# Patient Record
Sex: MALE | Race: BLACK OR AFRICAN AMERICAN | NOT HISPANIC OR LATINO | Employment: OTHER | URBAN - METROPOLITAN AREA
[De-identification: names, ages, dates, MRNs, and addresses within clinical notes are randomized per-mention and may not be internally consistent; named-entity substitution may affect disease eponyms.]

---

## 2018-11-26 ENCOUNTER — TELEPHONE (OUTPATIENT)
Dept: ENDOSCOPY | Facility: HOSPITAL | Age: 43
End: 2018-11-26

## 2018-11-26 DIAGNOSIS — R93.89 ABNORMAL FINDING ON IMAGING: Primary | ICD-10-CM

## 2018-11-27 NOTE — TELEPHONE ENCOUNTER
Message   Received: Today   Message Contents   MD Tiffany Sanchez MA   Caller: Unspecified (Yesterday,  3:35 PM)             EUS FNA for hypodense region int he uncinate measuring 2.4cm.     Has a pd stone with a history of pancreatitis.  Hold off on ERCP for now but talk to him about it while hes there for the EUS      Please sign order

## 2018-11-28 ENCOUNTER — TELEPHONE (OUTPATIENT)
Dept: ENDOSCOPY | Facility: HOSPITAL | Age: 43
End: 2018-11-28

## 2018-12-03 ENCOUNTER — TELEPHONE (OUTPATIENT)
Dept: ENDOSCOPY | Facility: HOSPITAL | Age: 43
End: 2018-12-03

## 2018-12-03 NOTE — TELEPHONE ENCOUNTER
Attempted to contact patient to schedule EUS. Got recording that patient is not accepting calls at this time.

## 2018-12-10 ENCOUNTER — TELEPHONE (OUTPATIENT)
Dept: ENDOSCOPY | Facility: HOSPITAL | Age: 43
End: 2018-12-10

## 2018-12-10 NOTE — TELEPHONE ENCOUNTER
Attempted to contact patient to schedule EUS. Got recording that he was not accepting call at this time.

## 2018-12-27 ENCOUNTER — TELEPHONE (OUTPATIENT)
Dept: ENDOSCOPY | Facility: HOSPITAL | Age: 43
End: 2018-12-27

## 2019-01-15 ENCOUNTER — TELEPHONE (OUTPATIENT)
Dept: ENDOSCOPY | Facility: HOSPITAL | Age: 44
End: 2019-01-15

## 2019-01-30 ENCOUNTER — TELEPHONE (OUTPATIENT)
Dept: ENDOSCOPY | Facility: HOSPITAL | Age: 44
End: 2019-01-30

## 2019-01-30 RX ORDER — LISINOPRIL 5 MG/1
5 TABLET ORAL DAILY
COMMUNITY

## 2019-01-30 RX ORDER — METFORMIN HYDROCHLORIDE 1000 MG/1
1000 TABLET ORAL
COMMUNITY

## 2019-01-30 RX ORDER — HYDRALAZINE HYDROCHLORIDE 10 MG/1
20 TABLET, FILM COATED ORAL DAILY
COMMUNITY
End: 2019-07-25

## 2019-03-26 ENCOUNTER — TELEPHONE (OUTPATIENT)
Dept: ENDOSCOPY | Facility: HOSPITAL | Age: 44
End: 2019-03-26

## 2019-03-26 NOTE — TELEPHONE ENCOUNTER
----- Message from Mary Jimenez sent at 3/26/2019  8:26 AM CDT -----  Contact: Self- [phone]  Donna- pt states he is trying to schedule a procedure with Dr. Thompson- was told to call back when he had updated insurance info- please contact pt at [phone]

## 2019-03-27 ENCOUNTER — TELEPHONE (OUTPATIENT)
Dept: ENDOSCOPY | Facility: HOSPITAL | Age: 44
End: 2019-03-27

## 2019-03-27 DIAGNOSIS — K86.1 CHRONIC PANCREATITIS, UNSPECIFIED PANCREATITIS TYPE: Primary | ICD-10-CM

## 2019-03-27 NOTE — TELEPHONE ENCOUNTER
Spoke with patient. EUS scheduled for 4/5 at 2p. Reviewed prep instructions. Ms Castro verbalized understanding.

## 2019-03-28 ENCOUNTER — TELEPHONE (OUTPATIENT)
Dept: ENDOSCOPY | Facility: HOSPITAL | Age: 44
End: 2019-03-28

## 2019-04-16 ENCOUNTER — TELEPHONE (OUTPATIENT)
Dept: ENDOSCOPY | Facility: HOSPITAL | Age: 44
End: 2019-04-16

## 2019-04-16 DIAGNOSIS — K85.90 ACUTE PANCREATITIS, UNSPECIFIED COMPLICATION STATUS, UNSPECIFIED PANCREATITIS TYPE: Primary | ICD-10-CM

## 2019-04-17 ENCOUNTER — TELEPHONE (OUTPATIENT)
Dept: ENDOSCOPY | Facility: HOSPITAL | Age: 44
End: 2019-04-17

## 2019-05-16 ENCOUNTER — TELEPHONE (OUTPATIENT)
Dept: ENDOSCOPY | Facility: HOSPITAL | Age: 44
End: 2019-05-16

## 2019-05-16 NOTE — TELEPHONE ENCOUNTER
Spoke with patient's SO. EUS scheduled for 6/13 at 10a. Reviewed prep instructions. She verbalized understanding.

## 2019-05-16 NOTE — TELEPHONE ENCOUNTER
----- Message from Mary Jimenez sent at 5/16/2019 11:39 AM CDT -----  Contact: Self- [phone]  Ananth- pt returning missed call from 4/17 in regards to scheduling EUS- please contact pt at [phone]

## 2019-05-29 ENCOUNTER — TELEPHONE (OUTPATIENT)
Dept: ENDOSCOPY | Facility: HOSPITAL | Age: 44
End: 2019-05-29

## 2019-06-13 ENCOUNTER — HOSPITAL ENCOUNTER (OUTPATIENT)
Facility: HOSPITAL | Age: 44
Discharge: HOME | End: 2019-06-13
Attending: INTERNAL MEDICINE | Admitting: INTERNAL MEDICINE
Payer: MEDICARE

## 2019-06-13 ENCOUNTER — ANESTHESIA (OUTPATIENT)
Dept: ENDOSCOPY | Facility: HOSPITAL | Age: 44
End: 2019-06-13
Payer: MEDICARE

## 2019-06-13 ENCOUNTER — ANESTHESIA EVENT (OUTPATIENT)
Dept: ENDOSCOPY | Facility: HOSPITAL | Age: 44
End: 2019-06-13
Payer: MEDICARE

## 2019-06-13 VITALS
DIASTOLIC BLOOD PRESSURE: 83 MMHG | OXYGEN SATURATION: 100 % | HEART RATE: 60 BPM | WEIGHT: 205 LBS | TEMPERATURE: 99 F | SYSTOLIC BLOOD PRESSURE: 157 MMHG | HEIGHT: 74 IN | BODY MASS INDEX: 26.31 KG/M2 | RESPIRATION RATE: 12 BRPM

## 2019-06-13 DIAGNOSIS — K85.90 ACUTE PANCREATITIS, UNSPECIFIED COMPLICATION STATUS, UNSPECIFIED PANCREATITIS TYPE: Primary | ICD-10-CM

## 2019-06-13 DIAGNOSIS — K86.1 CHRONIC PANCREATITIS: ICD-10-CM

## 2019-06-13 LAB
POCT GLUCOSE: 164 MG/DL (ref 70–110)
POCT GLUCOSE: 172 MG/DL (ref 70–110)

## 2019-06-13 PROCEDURE — D9220A PRA ANESTHESIA: Mod: ANES,,, | Performed by: ANESTHESIOLOGY

## 2019-06-13 PROCEDURE — 82962 GLUCOSE BLOOD TEST: CPT | Performed by: INTERNAL MEDICINE

## 2019-06-13 PROCEDURE — D9220A PRA ANESTHESIA: Mod: CRNA,,, | Performed by: NURSE ANESTHETIST, CERTIFIED REGISTERED

## 2019-06-13 PROCEDURE — D9220A PRA ANESTHESIA: ICD-10-PCS | Mod: ANES,,, | Performed by: ANESTHESIOLOGY

## 2019-06-13 PROCEDURE — 37000009 HC ANESTHESIA EA ADD 15 MINS: Performed by: INTERNAL MEDICINE

## 2019-06-13 PROCEDURE — D9220A PRA ANESTHESIA: ICD-10-PCS | Mod: CRNA,,, | Performed by: NURSE ANESTHETIST, CERTIFIED REGISTERED

## 2019-06-13 PROCEDURE — 63600175 PHARM REV CODE 636 W HCPCS: Performed by: NURSE ANESTHETIST, CERTIFIED REGISTERED

## 2019-06-13 PROCEDURE — 25000003 PHARM REV CODE 250: Performed by: INTERNAL MEDICINE

## 2019-06-13 PROCEDURE — 43259 EGD US EXAM DUODENUM/JEJUNUM: CPT | Mod: ,,, | Performed by: INTERNAL MEDICINE

## 2019-06-13 PROCEDURE — 43259 EGD US EXAM DUODENUM/JEJUNUM: CPT | Performed by: INTERNAL MEDICINE

## 2019-06-13 PROCEDURE — 43259 PR ENDOSCOPIC ULTRASOUND EXAM: ICD-10-PCS | Mod: ,,, | Performed by: INTERNAL MEDICINE

## 2019-06-13 PROCEDURE — 37000008 HC ANESTHESIA 1ST 15 MINUTES: Performed by: INTERNAL MEDICINE

## 2019-06-13 RX ORDER — ONDANSETRON 2 MG/ML
4 INJECTION INTRAMUSCULAR; INTRAVENOUS ONCE AS NEEDED
Status: DISCONTINUED | OUTPATIENT
Start: 2019-06-13 | End: 2019-06-13 | Stop reason: HOSPADM

## 2019-06-13 RX ORDER — SODIUM CHLORIDE 0.9 % (FLUSH) 0.9 %
10 SYRINGE (ML) INJECTION
Status: DISCONTINUED | OUTPATIENT
Start: 2019-06-13 | End: 2019-06-13 | Stop reason: HOSPADM

## 2019-06-13 RX ORDER — SODIUM CHLORIDE 0.9 % (FLUSH) 0.9 %
3 SYRINGE (ML) INJECTION
Status: DISCONTINUED | OUTPATIENT
Start: 2019-06-13 | End: 2019-06-13 | Stop reason: HOSPADM

## 2019-06-13 RX ORDER — PROPOFOL 10 MG/ML
VIAL (ML) INTRAVENOUS CONTINUOUS PRN
Status: DISCONTINUED | OUTPATIENT
Start: 2019-06-13 | End: 2019-06-13

## 2019-06-13 RX ORDER — SODIUM CHLORIDE 9 MG/ML
INJECTION, SOLUTION INTRAVENOUS CONTINUOUS
Status: DISCONTINUED | OUTPATIENT
Start: 2019-06-13 | End: 2019-06-13 | Stop reason: HOSPADM

## 2019-06-13 RX ORDER — FENTANYL CITRATE 50 UG/ML
INJECTION, SOLUTION INTRAMUSCULAR; INTRAVENOUS
Status: DISCONTINUED | OUTPATIENT
Start: 2019-06-13 | End: 2019-06-13

## 2019-06-13 RX ORDER — PROPOFOL 10 MG/ML
VIAL (ML) INTRAVENOUS
Status: DISCONTINUED | OUTPATIENT
Start: 2019-06-13 | End: 2019-06-13

## 2019-06-13 RX ADMIN — FENTANYL CITRATE 25 MCG: 50 INJECTION, SOLUTION INTRAMUSCULAR; INTRAVENOUS at 10:06

## 2019-06-13 RX ADMIN — PROPOFOL 125 MCG/KG/MIN: 10 INJECTION, EMULSION INTRAVENOUS at 10:06

## 2019-06-13 RX ADMIN — PROPOFOL 50 MG: 10 INJECTION, EMULSION INTRAVENOUS at 10:06

## 2019-06-13 RX ADMIN — SODIUM CHLORIDE: 0.9 INJECTION, SOLUTION INTRAVENOUS at 10:06

## 2019-06-13 NOTE — ANESTHESIA PREPROCEDURE EVALUATION
06/13/2019  Wallace Gonzalez is a 44 y.o., male.    Anesthesia Evaluation    I have reviewed the Patient Summary Reports.    I have reviewed the Nursing Notes.      Review of Systems  Anesthesia Hx:  No problems with previous Anesthesia    Hematology/Oncology:  Hematology Normal   Oncology Normal     EENT/Dental:EENT/Dental Normal   Cardiovascular:   Hypertension    Pulmonary:   Asthma    Renal/:  Renal/ Normal     Hepatic/GI:  Hepatic/GI Normal    Musculoskeletal:  Musculoskeletal Normal    Neurological:   Neuromuscular Disease,    Endocrine:   Diabetes    Dermatological:  Skin Normal    Psych:  Psychiatric Normal           Physical Exam  General:  Well nourished    Airway/Jaw/Neck:  Airway Findings: Mouth Opening: Normal Tongue: Normal  General Airway Assessment: Adult  Mallampati: II  TM Distance: Normal, at least 6 cm        Eyes/Ears/Nose:  EYES/EARS/NOSE FINDINGS: Normal   Dental:  Dental Findings:   Chest/Lungs:  Chest/Lungs Clear    Heart/Vascular:  Heart Findings: Normal Heart murmur: negative Vascular Findings: Normal    Abdomen:  Abdomen Findings: Normal    Musculoskeletal:  Musculoskeletal Findings: Normal   Skin:  Skin Findings: Normal    Mental Status:  Mental Status Findings: Normal        Anesthesia Plan  Type of Anesthesia, risks & benefits discussed:  Anesthesia Type:  general  Patient's Preference:   Intra-op Monitoring Plan:   Intra-op Monitoring Plan Comments:   Post Op Pain Control Plan:   Post Op Pain Control Plan Comments:   Induction:   IV  Beta Blocker:  Patient is not currently on a Beta-Blocker (No further documentation required).       Informed Consent: Patient understands risks and agrees with Anesthesia plan.  Questions answered. Anesthesia consent signed with patient.  ASA Score: 3     Day of Surgery Review of History & Physical:    H&P update referred to the surgeon.          Ready For Surgery From Anesthesia Perspective.

## 2019-06-13 NOTE — ANESTHESIA POSTPROCEDURE EVALUATION
Anesthesia Post Evaluation    Patient: Wallace Gonzalez    Procedure(s) Performed: Procedure(s) (LRB):  ULTRASOUND, UPPER GI TRACT, ENDOSCOPIC (N/A)    Final Anesthesia Type: general  Patient location during evaluation: PACU  Patient participation: Yes- Able to Participate  Level of consciousness: awake and alert  Post-procedure vital signs: reviewed and stable  Pain management: adequate  Airway patency: patent  PONV status at discharge: No PONV  Anesthetic complications: no      Cardiovascular status: blood pressure returned to baseline  Respiratory status: spontaneous ventilation and room air  Hydration status: euvolemic  Follow-up not needed.          Vitals Value Taken Time   /83 6/13/2019 11:33 AM   Temp 37 °C (98.6 °F) 6/13/2019 11:30 AM   Pulse 61 6/13/2019 11:33 AM   Resp 22 6/13/2019 11:32 AM   SpO2 100 % 6/13/2019 11:33 AM   Vitals shown include unvalidated device data.      No case tracking events are documented in the log.      Pain/Roberto Score: Roberto Score: 10 (6/13/2019 11:35 AM)

## 2019-06-13 NOTE — PLAN OF CARE
Discharge instructions reviewed with pt and fiance. Understanding verbalized. No complaints of pain reported. MD Lyn to bedside to address findings and POC. Pt able to tolerate po intake. To be transported to car by PCT.

## 2019-06-13 NOTE — [employer]ATION PATIENT INSTRUCTIONS
Discharge Summary/Instructions after an Endoscopic Procedure  Patient Name: Wallace Gonzalez  Patient MRN: [id number]  Patient YOB: 1975  Thursday, June 13, 2019  Aureliano Lyn MD  RESTRICTIONS:  During your procedure today, you received medications for sedation.  These   medications may affect your judgment, balance and coordination.  Therefore,   for 24 hours, you have the following restrictions:   - DO NOT drive a car, operate machinery, make legal/financial decisions,   sign important papers or drink alcohol.    ACTIVITY:  Today: no heavy lifting, straining or running due to procedural   sedation/anesthesia.  The following day: return to full activity including work.  DIET:  Eat and drink normally unless instructed otherwise.     TREATMENT FOR COMMON SIDE EFFECTS:  - Mild abdominal pain, nausea, belching, bloating or excessive gas:  rest,   eat lightly and use a heating pad.  - Sore Throat: treat with throat lozenges and/or gargle with warm salt   water.  - Because air was used during the procedure, expelling large amounts of air   from your rectum or belching is normal.  - If a bowel prep was taken, you may not have a bowel movement for 1-3 days.    This is normal.  SYMPTOMS TO WATCH FOR AND REPORT TO YOUR PHYSICIAN:  1. Abdominal pain or bloating, other than gas cramps.  2. Chest pain.  3. Back pain.  4. Signs of infection such as: chills or fever occurring within 24 hours   after the procedure.  5. Rectal bleeding, which would show as bright red, maroon, or black stools.   (A tablespoon of blood from the rectum is not serious, especially if   hemorrhoids are present.)  6. Vomiting.  7. Weakness or dizziness.  GO DIRECTLY TO THE NEAREST EMERGENCY ROOM IF YOU HAVE ANY OF THE FOLLOWING:      Difficulty breathing              Chills and/or fever over 101 F   Persistent vomiting and/or vomiting blood   Severe abdominal pain   Severe chest pain   Black, tarry stools   Bleeding- more than one  tablespoon   Any other symptom or condition that you feel may need urgent attention  Your doctor recommends these additional instructions:  If any biopsies were taken, your doctors clinic will contact you in 1 to 2   weeks with any results.  - Discharge patient to home.   - Low fat diet.   - Perform magnetic resonance imaging (MRI) with gadolinium at appointment to   be scheduled. We'll consider an ERCP and ductoscopy after results  - FNA felt to be low yield  For questions, problems or results please call your physician - Aureliano Lyn MD at Work:  [phone].  [facility], EMERGENCY ROOM PHONE NUMBER: [phone]  IF A COMPLICATION OR EMERGENCY SITUATION ARISES AND YOU ARE UNABLE TO REACH   YOUR PHYSICIAN - GO DIRECTLY TO THE EMERGENCY ROOM.  Aureliano Lyn MD  6/13/2019 11:00:45 AM  This report has been verified and signed electronically.  [employer]

## 2019-06-13 NOTE — TRANSFER OF CARE
"Anesthesia Transfer of Care Note    Patient: Wallace Gonzalez    Procedure(s) Performed: Procedure(s) (LRB):  ULTRASOUND, UPPER GI TRACT, ENDOSCOPIC (N/A)    Patient location: PACU    Anesthesia Type: general    Transport from OR: Transported from OR on 2-3 L/min O2 by NC with adequate spontaneous ventilation    Post pain: adequate analgesia    Post assessment: no apparent anesthetic complications and tolerated procedure well    Post vital signs: stable    Level of consciousness: awake and alert    Nausea/Vomiting: no nausea/vomiting    Complications: none    Transfer of care protocol was followed      Last vitals:   Visit Vitals  BP (!) 154/90   Pulse 65   Temp 36.7 °C (98.1 °F)   Resp 18   Ht 6' 2" (1.88 m)   Wt 93 kg (205 lb)   SpO2 100%   BMI 26.32 kg/m²     "

## 2019-06-13 NOTE — TRANSFER OF CARE
"Anesthesia Transfer of Care Note    Patient: Wallace Gonzalez    Procedure(s) Performed: Procedure(s) (LRB):  ULTRASOUND, UPPER GI TRACT, ENDOSCOPIC (N/A)    Patient location: PACU    Anesthesia Type: general    Transport from OR: Transported from OR on room air with adequate spontaneous ventilation    Post pain: adequate analgesia    Post assessment: no apparent anesthetic complications and tolerated procedure well    Post vital signs: stable    Level of consciousness: awake    Nausea/Vomiting: no nausea/vomiting    Complications: none    Transfer of care protocol was followed      Last vitals:   Visit Vitals  /79   Pulse 72   Temp 36.7 °C (98 °F) (Skin)   Resp 14   Ht 6' 2" (1.88 m)   Wt 93 kg (205 lb)   SpO2 100%   BMI 26.32 kg/m²     "

## 2019-06-13 NOTE — H&P
Short Stay Endoscopy History and Physical    PCP - Roberto Carlos Nava Jr, MD  Referring Physician - Roberto Carlos Nava Jr., MD  [facility] [place] [address]    Procedure - eus  ASA - per anesthesia  Mallampati - per anesthesia  History of Anesthesia problems - no  Family history Anesthesia problems -  no   Plan of anesthesia - General    HPI:  This is a 44 y.o. male here for evaluation of: chronic pancreatitis    Reflux - no  Dysphagia - no  Abdominal pain - no  Diarrhea - no    ROS:  Constitutional: No fevers, chills, No weight loss  CV: No chest pain  Pulm: No cough, No shortness of breath  Ophtho: No vision changes  GI: see HPI  Derm: No rash    Medical History:  has a past medical history of Bronchitis, Diabetic peripheral neuropathy, DM2 (diabetes mellitus, type 2), HTN (hypertension), Hypercholesterolemia, Pancreatic duct stones, Pancreatic lesion, and Pancreatitis.    Surgical History:  has a past surgical history that includes Endoscopic ultrasound of upper gastrointestinal tract (N/A, 2/7/2019) and Endoscopic ultrasound of upper gastrointestinal tract (N/A, 4/5/2019).    Family History: family history is not on file..    Social History:  reports that he has been smoking cigarettes.  He does not have any smokeless tobacco history on file. He reports that he drinks alcohol. He reports that he has current or past drug history.    Review of patient's allergies indicates:  No Known Allergies    Medications:   Medications Prior to Admission   Medication Sig Dispense Refill Last Dose    albuterol 90 mcg/actuation inhaler Inhale 2 puffs into the lungs every 6 (six) hours as needed for Wheezing.   6/13/2019 at Unknown time    amitriptyline (ELAVIL) 50 MG tablet Take 50 mg by mouth every evening.   Past Week at Unknown time    gabapentin (NEURONTIN) 600 MG tablet Take 600 mg by mouth 3 (three) times daily.   6/13/2019 at Unknown time    hydrocodone-acetaminophen 5-325mg (NORCO) 5-325 mg per  tablet Take 1 tablet by mouth every 6 (six) hours as needed for Pain. 12 tablet 0 6/13/2019 at Unknown time    lisinopril (PRINIVIL,ZESTRIL) 5 MG tablet Take 5 mg by mouth once daily.   6/13/2019 at Unknown time    metFORMIN (GLUCOPHAGE) 1000 MG tablet Take 1,000 mg by mouth daily with breakfast.   6/13/2019 at Unknown time    omeprazole (PRILOSEC) 20 MG capsule Take 1 capsule (20 mg total) by mouth once daily. 30 capsule 1 6/13/2019 at Unknown time    hydrALAZINE (APRESOLINE) 10 MG tablet Take 20 mg by mouth once daily.   Unknown at Unknown time    ondansetron (ZOFRAN-ODT) 4 MG TbDL Take 8 mg by mouth every 8 (eight) hours.   More than a month at Unknown time    rosuvastatin calcium (ROSUVASTATIN ORAL) Take 1 tablet by mouth every evening.   More than a month at Unknown time       Physical Exam:    Vital Signs:   Vitals:    06/13/19 0942   BP: (!) 154/90   Pulse: 65   Resp: 18   Temp: 98.1 °F (36.7 °C)       General Appearance: Well appearing in no acute distress    Labs:  Lab Results   Component Value Date    WBC 2.96 (L) 12/17/2014    HGB 12.3 (L) 12/17/2014    HCT 37.1 (L) 12/17/2014     12/17/2014    CHOL 194 12/12/2014    TRIG 72 12/12/2014    HDL 49 12/12/2014    ALT 24 12/11/2014    AST 35 12/11/2014     12/17/2014    K 3.9 12/17/2014     12/17/2014    CREATININE 0.8 12/17/2014    BUN 3 (L) 12/17/2014    CO2 24 12/17/2014       I have explained the risks and benefits of this endoscopic procedure to the patient including but not limited to bleeding, inflammation, infection, perforation, and death.      Aureliano Lyn MD

## 2019-07-02 ENCOUNTER — TELEPHONE (OUTPATIENT)
Dept: ENDOSCOPY | Facility: HOSPITAL | Age: 44
End: 2019-07-02

## 2019-07-02 DIAGNOSIS — K86.1 CHRONIC PANCREATITIS, UNSPECIFIED PANCREATITIS TYPE: Primary | ICD-10-CM

## 2019-07-02 DIAGNOSIS — R93.2 ABNORMAL FINDINGS ON IMAGING OF BILIARY TRACT: ICD-10-CM

## 2019-07-10 ENCOUNTER — TELEPHONE (OUTPATIENT)
Dept: GASTROENTEROLOGY | Facility: CLINIC | Age: 44
End: 2019-07-10

## 2019-07-10 NOTE — TELEPHONE ENCOUNTER
Called and spoke to pt.  Pt is scheduled on 07/15 @ 4:45pm w/ 4:00pm arrival.  Pt is aware of 4 hour fasting.  Pt accepted appt.

## 2019-07-10 NOTE — TELEPHONE ENCOUNTER
----- Message from Tiffany Murry MA sent at 7/10/2019  1:01 PM CDT -----  Contact: pt#[phone]      ----- Message -----  From: Izzy Saxena  Sent: 7/10/2019  12:38 PM  To: Riki Bedoya Staff Luke Lebron    Patient Returning Call from [facility]    Who Left Message for Patient:Astrdi   Communication Preference:call  Additional Information:

## 2019-07-15 ENCOUNTER — HOSPITAL ENCOUNTER (OUTPATIENT)
Dept: RADIOLOGY | Facility: HOSPITAL | Age: 44
Discharge: HOME | End: 2019-07-15
Attending: INTERNAL MEDICINE
Payer: MEDICARE

## 2019-07-15 DIAGNOSIS — R93.2 ABNORMAL FINDINGS ON IMAGING OF BILIARY TRACT: ICD-10-CM

## 2019-07-15 DIAGNOSIS — K86.1 CHRONIC PANCREATITIS, UNSPECIFIED PANCREATITIS TYPE: ICD-10-CM

## 2019-07-15 PROCEDURE — 76376 3D RENDER W/INTRP POSTPROCES: CPT | Mod: TC

## 2019-07-15 PROCEDURE — 74181 MRI ABDOMEN W/O CONTRAST: CPT | Mod: 26,,, | Performed by: RADIOLOGY

## 2019-07-15 PROCEDURE — 76376 3D RENDER W/INTRP POSTPROCES: CPT | Mod: 26,,, | Performed by: RADIOLOGY

## 2019-07-15 PROCEDURE — 76376 MRI ABDOMEN WITHOUT CONTRAST MRCP: ICD-10-PCS | Mod: 26,,, | Performed by: RADIOLOGY

## 2019-07-15 PROCEDURE — 74181 MRI ABDOMEN W/O CONTRAST: CPT | Mod: TC

## 2019-07-15 PROCEDURE — 74181 MRI ABDOMEN WITHOUT CONTRAST MRCP: ICD-10-PCS | Mod: 26,,, | Performed by: RADIOLOGY

## 2019-07-16 ENCOUNTER — TELEPHONE (OUTPATIENT)
Dept: GASTROENTEROLOGY | Facility: CLINIC | Age: 44
End: 2019-07-16

## 2019-07-16 ENCOUNTER — TELEPHONE (OUTPATIENT)
Dept: GASTROENTEROLOGY | Facility: HOSPITAL | Age: 44
End: 2019-07-16

## 2019-07-16 NOTE — TELEPHONE ENCOUNTER
----- Message from Marisol Laird sent at 7/16/2019  9:37 AM CDT -----  Contact: Self/ [phone]  Patient called in returning your call. Please call and advise.

## 2019-07-16 NOTE — TELEPHONE ENCOUNTER
The MRI showed a mass lesion in the pancreas consisted with the EUS. I don't think this represents a stone as it was essentially isoechoic to the pancreas and there was no chronic ductal dilation    I'm not sure we'll be able to perform  ductoscopy due to the possibility of pancreas divisum.      We'll refer him to the surgeons to help decided whether this should be removed regardless of the histology

## 2019-07-16 NOTE — TELEPHONE ENCOUNTER
MD Tiffany Sanchez MA   Caller: Unspecified (Today,  9:41 AM)             I did and just called again, but he didn't answer

## 2019-07-17 ENCOUNTER — TELEPHONE (OUTPATIENT)
Dept: SURGERY | Facility: CLINIC | Age: 44
End: 2019-07-17

## 2019-07-17 NOTE — TELEPHONE ENCOUNTER
----- Message from Jame Rosenthal sent at 7/17/2019  3:57 PM CDT -----  Pt calling to schedule a appt.    [phone]

## 2019-07-25 ENCOUNTER — INITIAL CONSULT (OUTPATIENT)
Dept: SURGERY | Facility: CLINIC | Age: 44
End: 2019-07-25
Payer: MEDICARE

## 2019-07-25 VITALS
HEIGHT: 74 IN | HEART RATE: 93 BPM | TEMPERATURE: 97 F | SYSTOLIC BLOOD PRESSURE: 172 MMHG | DIASTOLIC BLOOD PRESSURE: 100 MMHG | WEIGHT: 205.94 LBS | BODY MASS INDEX: 26.43 KG/M2

## 2019-07-25 DIAGNOSIS — K86.0 ALCOHOL-INDUCED CHRONIC PANCREATITIS: Primary | ICD-10-CM

## 2019-07-25 PROCEDURE — 99999 PR PBB SHADOW E&M-EST. PATIENT-LVL III: ICD-10-PCS | Mod: PBBFAC,,, | Performed by: SURGERY

## 2019-07-25 PROCEDURE — 99205 OFFICE O/P NEW HI 60 MIN: CPT | Mod: S$PBB,,, | Performed by: SURGERY

## 2019-07-25 PROCEDURE — 99205 PR OFFICE/OUTPT VISIT, NEW, LEVL V, 60-74 MIN: ICD-10-PCS | Mod: S$PBB,,, | Performed by: SURGERY

## 2019-07-25 PROCEDURE — 99213 OFFICE O/P EST LOW 20 MIN: CPT | Mod: PBBFAC | Performed by: SURGERY

## 2019-07-25 PROCEDURE — 99999 PR PBB SHADOW E&M-EST. PATIENT-LVL III: CPT | Mod: PBBFAC,,, | Performed by: SURGERY

## 2019-07-25 RX ORDER — HYDROCODONE BITARTRATE AND ACETAMINOPHEN 10; 325 MG/1; MG/1
TABLET ORAL
Refills: 0 | COMMUNITY
Start: 2019-07-16

## 2019-07-25 RX ORDER — OMEPRAZOLE 40 MG/1
CAPSULE, DELAYED RELEASE ORAL
Refills: 4 | COMMUNITY
Start: 2019-07-08

## 2019-07-25 RX ORDER — MELOXICAM 15 MG/1
TABLET ORAL
Refills: 2 | Status: ON HOLD | COMMUNITY
Start: 2019-07-18 | End: 2020-08-15 | Stop reason: HOSPADM

## 2019-07-25 RX ORDER — CLONIDINE HYDROCHLORIDE 0.1 MG/1
TABLET ORAL
Refills: 5 | COMMUNITY
Start: 2019-06-03

## 2019-07-25 RX ORDER — METOPROLOL SUCCINATE 50 MG/1
TABLET, EXTENDED RELEASE ORAL
Refills: 5 | COMMUNITY
Start: 2019-06-06

## 2019-07-25 NOTE — PROGRESS NOTES
SURGICAL ONCOLOGY CLINIC  HISTORY AND PHYSICAL    CC:  Chronic pancreatitis with mass vs stone    HPI:  Wallace Gonzalez is a 44 y.o.  male with Hx of HTN (on 3 medications), DM on insulin, Asthma who presents to clinic for evaluation of pancreatic duct stone vs mass.  He has history of chronic alcohol induced pancreatitis dating back almost 20 years.  He has been hospitalized around 10 times in this time frame.  He still drinks occasionally but nothing like he used to.  His last flare up was 3-4 months ago.  When he experiences pain, it is epigastric pain that radiates to his back.  He is able to tolerate a diet with the exception of spicy foods.  Having normal bowel movements.  He is on Creon.        ROS: A 10-point review of systems is negative except for the above mentioned in the HPI.     Past Medical History:   Diagnosis Date    Bronchitis     Diabetic peripheral neuropathy     DM2 (diabetes mellitus, type 2)     HTN (hypertension)     Hypercholesterolemia     Pancreatic duct stones     Pancreatic lesion     Pancreatitis        Past Surgical History:   Procedure Laterality Date    ULTRASOUND, UPPER GI TRACT, ENDOSCOPIC N/A 6/13/2019    Performed by [facility] MD at [facility] (2ND FLR)    ULTRASOUND, UPPER GI TRACT, ENDOSCOPIC N/A 4/5/2019    Performed by Charles Wadsworth MD at [facility] ENDO (2ND FLR)    ULTRASOUND, UPPER GI TRACT, ENDOSCOPIC N/A 2/7/2019    Performed by Jv Thompson MD at [facility] ENDO (2ND FLR)       Social History     Socioeconomic History    Marital status: Single     Spouse name: Not on file    Number of children: Not on file    Years of education: Not on file    Highest education level: Not on file   Occupational History    Not on file   Social Needs    Financial resource strain: Not on file    Food insecurity:     Worry: Not on file     Inability: Not on file    Transportation needs:     Medical: Not on file     Non-medical: Not on file   Tobacco Use     Smoking status: Light Tobacco Smoker     Types: Cigarettes   Substance and Sexual Activity    Alcohol use: Yes     Comment: previous extensive use    Drug use: Yes     Comment: Marijuana on occasion    Sexual activity: Not on file   Lifestyle    Physical activity:     Days per week: Not on file     Minutes per session: Not on file    Stress: Not on file   Relationships    Social connections:     Talks on phone: Not on file     Gets together: Not on file     Attends [employer] service: Not on file     Active member of club or organization: Not on file     Attends meetings of clubs or organizations: Not on file     Relationship status: Not on file   Other Topics Concern    Not on file   Social History Narrative    Not on file       Review of patient's allergies indicates:  No Known Allergies      PHYSICAL EXAM:  Vitals:    07/25/19 0842   BP: (!) 172/100   Pulse: 93   Temp: 97.2 °F (36.2 °C)       General: NAD  Neuro: AAOx3  Cardio: RRR  Resp: Breathing even and unlabored  Abd: Soft, ND, NT, no palpable mass, BS+  Ext: Warm and well perfused      PERTINENT LABS:  Reviewed. None.      PERTINENT IMAGING:  EUS:  An oval mass was identified in the main pancreatic duct. The mass was isoechoic.  with some punctate calcifications The mass measured 18 mm by 9 mm in  maximal cross-sectional diameter. The endosonographic borders were welldefined.  An intact interface was seen between the mass and the adjacent  structures suggesting a lack of invasion. The remainder of the pancreas was  examined. The endosonographic appearance of parenchyma and the upstream  pancreatic duct indicated no duct dilation and no parenchymal atrophy. There was  some heterogeneity of the pancreatic parenchyma upstream of the intraductal  mass. 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    MRI  Impression       1. Examination limited by patient motion artifact and lack of IV  contrast.  2. Focal area of low signal intensity in the pancreatic neck measuring 1.8 cm in size, without significant upstream ductal dilatation.  This finding is nonspecific in the absence of IV contrast, and could represent a pancreatic duct stone.  Mass lesion is felt less likely.  Correlation with endoscopic ultrasound is recommended.  3. Two stable hepatic lesions demonstrating high T2/low T1 signal intensity, most likely hemangiomas or cysts.  4. Subcentimeter cystic lesions within the pancreatic head, the smaller of which is stable when compared to MRI 12/16/2014, and most likely a side branch IPMN.  The 6 mm lesion may represent focal distension of the pancreatic duct in this patient with a history of chronic pancreatitis.  5. Stable right renal cyst.           ASSESSMENT/PLAN:  Wallace Gonzalez is a 44 y.o. male with history of chronic pancreatitis with mass seen in the head of the pancreas     Kings Nixon M.D.  General Surgery PGY4  [phone]    I have seen the patient, reviewed the attached resident or NP's history and physical, assessment and plan. I have personally interviewed and examined the patient at bedside and agree with the findings.      Chronic pancreatitis, mass vs stone in neck.  Upstream duct not significantly dilated.  he is still drinking and smoking.    counselled re: cessation of EtOH/Tobacco  EUS/bx of neck mass.  If not malignant on bx, have no surgical plans (no real target for surgical palliation, and he is still drinking).      Antonio Walters MD, FACS  Upper GI / Hepatobiliary Surgical Oncology  [facility] [place], [place]  Office: [phone]  Fax: [phone]

## 2019-07-25 NOTE — LETTER
August 5, 2019      Aureliano Lyn MD  [facility]  [address] [place] [id number]           [facility] - Gen Surg/Surg Onc  [facility]  [place] [address]  Phone: [phone]          Patient: Wallace Gonzalez   MR Number: [id number]   YOB: 1975   Date of Visit: 7/25/2019       Dear Dr. Aureliano Lyn:    Thank you for referring Wallace Gonzalez to me for evaluation. Attached you will find relevant portions of my assessment and plan of care.    If you have questions, please do not hesitate to call me. I look forward to following Wallace Gonzalez along with you.    Sincerely,    Antonio Walters MD    Enclosure  CC:  No Recipients    If you would like to receive this communication electronically, please contact [email or web address] or [phone] to request more information on [employer] access.    For providers and/or their staff who would like to refer a patient to [facility], please contact us through our one-stop-shop provider referral line, [facility], at [phone].    If you feel you have received this communication in error or would no longer like to receive these types of communications, please e-mail [email or web address]

## 2019-07-29 ENCOUNTER — TELEPHONE (OUTPATIENT)
Dept: ENDOSCOPY | Facility: HOSPITAL | Age: 44
End: 2019-07-29

## 2019-07-29 DIAGNOSIS — K86.2 PANCREAS CYST: Primary | ICD-10-CM

## 2019-07-29 NOTE — TELEPHONE ENCOUNTER
----- Message from Aureliano Lyn MD sent at 7/26/2019  7:29 AM CDT -----  Yes please main campus  ----- Message -----  From: Tiffany Murry MA  Sent: 7/25/2019   4:42 PM  To: Aureliano Lyn MD        ----- Message -----  From: So Driver RN  Sent: 7/25/2019   3:19 PM  To: Tiffany Murry MA    Needs EUS/Biopsy with Dr. Lyn per Dr. Walters.    Thanks,    So PIERRE

## 2019-07-31 ENCOUNTER — TELEPHONE (OUTPATIENT)
Dept: ENDOSCOPY | Facility: HOSPITAL | Age: 44
End: 2019-07-31

## 2019-07-31 NOTE — TELEPHONE ENCOUNTER
Spoke with patient. EUS scheduled for 8/16 at 9:30a. Reviewed prep instructions. Mr Gonzalez verbalized understanding.

## 2019-08-06 ENCOUNTER — TELEPHONE (OUTPATIENT)
Dept: ENDOSCOPY | Facility: HOSPITAL | Age: 44
End: 2019-08-06

## 2019-08-06 NOTE — TELEPHONE ENCOUNTER
----- Message from Augie Preston sent at 8/6/2019  1:08 PM CDT -----  Contact: Pt  Pt would like to be called back asap regarding his appt scheduled for 8/16/19 at 9:30am.    Pt can be reached at [phone].    Nkwz

## 2019-08-06 NOTE — TELEPHONE ENCOUNTER
Spoke with patient about instructions for UEUS scheduled 8/29/19 at 0800 (new date/time per patient request).  Instructions mailed.

## 2019-08-29 ENCOUNTER — ANESTHESIA (OUTPATIENT)
Dept: ENDOSCOPY | Facility: HOSPITAL | Age: 44
End: 2019-08-29
Payer: MEDICARE

## 2019-08-29 ENCOUNTER — HOSPITAL ENCOUNTER (OUTPATIENT)
Facility: HOSPITAL | Age: 44
Discharge: HOME | End: 2019-08-29
Attending: INTERNAL MEDICINE | Admitting: INTERNAL MEDICINE
Payer: MEDICARE

## 2019-08-29 ENCOUNTER — ANESTHESIA EVENT (OUTPATIENT)
Dept: ENDOSCOPY | Facility: HOSPITAL | Age: 44
End: 2019-08-29
Payer: MEDICARE

## 2019-08-29 VITALS
HEART RATE: 87 BPM | OXYGEN SATURATION: 100 % | SYSTOLIC BLOOD PRESSURE: 130 MMHG | BODY MASS INDEX: 26.95 KG/M2 | DIASTOLIC BLOOD PRESSURE: 70 MMHG | WEIGHT: 210 LBS | HEIGHT: 74 IN | TEMPERATURE: 98 F | RESPIRATION RATE: 18 BRPM

## 2019-08-29 DIAGNOSIS — K86.9 LESION OF PANCREAS: ICD-10-CM

## 2019-08-29 LAB — POCT GLUCOSE: 176 MG/DL (ref 70–110)

## 2019-08-29 PROCEDURE — 88172 CYTP DX EVAL FNA 1ST EA SITE: CPT | Mod: 26,,, | Performed by: PATHOLOGY

## 2019-08-29 PROCEDURE — 88305 TISSUE EXAM BY PATHOLOGIST: CPT | Mod: 26,,, | Performed by: PATHOLOGY

## 2019-08-29 PROCEDURE — 37000009 HC ANESTHESIA EA ADD 15 MINS: Performed by: INTERNAL MEDICINE

## 2019-08-29 PROCEDURE — 63600175 PHARM REV CODE 636 W HCPCS: Performed by: INTERNAL MEDICINE

## 2019-08-29 PROCEDURE — 63600175 PHARM REV CODE 636 W HCPCS: Performed by: NURSE ANESTHETIST, CERTIFIED REGISTERED

## 2019-08-29 PROCEDURE — D9220A PRA ANESTHESIA: Mod: CRNA,,, | Performed by: NURSE ANESTHETIST, CERTIFIED REGISTERED

## 2019-08-29 PROCEDURE — 43242 PR UPGI ENDOSCOPY,FN NEEDLE BX,GUIDED: ICD-10-PCS | Mod: ,,, | Performed by: INTERNAL MEDICINE

## 2019-08-29 PROCEDURE — 82962 GLUCOSE BLOOD TEST: CPT | Performed by: INTERNAL MEDICINE

## 2019-08-29 PROCEDURE — 88173 CYTOLOGY SPECIMEN- FNA RADIOLOGY GUIDED, BRONCH/EBUS, EUS/GI: ICD-10-PCS | Mod: 26,,, | Performed by: PATHOLOGY

## 2019-08-29 PROCEDURE — 37000008 HC ANESTHESIA 1ST 15 MINUTES: Performed by: INTERNAL MEDICINE

## 2019-08-29 PROCEDURE — 88172 CYTOLOGY SPECIMEN- FNA RADIOLOGY GUIDED, BRONCH/EBUS, EUS/GI: ICD-10-PCS | Mod: 26,,, | Performed by: PATHOLOGY

## 2019-08-29 PROCEDURE — D9220A PRA ANESTHESIA: ICD-10-PCS | Mod: CRNA,,, | Performed by: NURSE ANESTHETIST, CERTIFIED REGISTERED

## 2019-08-29 PROCEDURE — D9220A PRA ANESTHESIA: ICD-10-PCS | Mod: ANES,,, | Performed by: ANESTHESIOLOGY

## 2019-08-29 PROCEDURE — 88305 CYTOLOGY SPECIMEN- FNA RADIOLOGY GUIDED, BRONCH/EBUS, EUS/GI: ICD-10-PCS | Mod: 26,,, | Performed by: PATHOLOGY

## 2019-08-29 PROCEDURE — 43242 EGD US FINE NEEDLE BX/ASPIR: CPT | Performed by: INTERNAL MEDICINE

## 2019-08-29 PROCEDURE — D9220A PRA ANESTHESIA: Mod: ANES,,, | Performed by: ANESTHESIOLOGY

## 2019-08-29 PROCEDURE — 88173 CYTOPATH EVAL FNA REPORT: CPT | Mod: 26,,, | Performed by: PATHOLOGY

## 2019-08-29 PROCEDURE — 27202059 HC NEEDLE, FNA (ANY): Performed by: INTERNAL MEDICINE

## 2019-08-29 PROCEDURE — 88305 TISSUE EXAM BY PATHOLOGIST: CPT | Performed by: PATHOLOGY

## 2019-08-29 PROCEDURE — 43242 EGD US FINE NEEDLE BX/ASPIR: CPT | Mod: ,,, | Performed by: INTERNAL MEDICINE

## 2019-08-29 RX ORDER — FENTANYL CITRATE 50 UG/ML
25 INJECTION, SOLUTION INTRAMUSCULAR; INTRAVENOUS EVERY 5 MIN PRN
Status: DISCONTINUED | OUTPATIENT
Start: 2019-08-29 | End: 2019-08-29 | Stop reason: HOSPADM

## 2019-08-29 RX ORDER — LIDOCAINE HCL/PF 100 MG/5ML
SYRINGE (ML) INTRAVENOUS
Status: DISCONTINUED | OUTPATIENT
Start: 2019-08-29 | End: 2019-08-29

## 2019-08-29 RX ORDER — PROPOFOL 10 MG/ML
VIAL (ML) INTRAVENOUS
Status: DISCONTINUED | OUTPATIENT
Start: 2019-08-29 | End: 2019-08-29

## 2019-08-29 RX ORDER — SODIUM CHLORIDE 9 MG/ML
INJECTION, SOLUTION INTRAVENOUS CONTINUOUS
Status: DISCONTINUED | OUTPATIENT
Start: 2019-08-29 | End: 2019-08-29 | Stop reason: HOSPADM

## 2019-08-29 RX ORDER — DIPHENHYDRAMINE HYDROCHLORIDE 50 MG/ML
25 INJECTION INTRAMUSCULAR; INTRAVENOUS EVERY 6 HOURS PRN
Status: DISCONTINUED | OUTPATIENT
Start: 2019-08-29 | End: 2019-08-29 | Stop reason: HOSPADM

## 2019-08-29 RX ORDER — ONDANSETRON 2 MG/ML
4 INJECTION INTRAMUSCULAR; INTRAVENOUS ONCE AS NEEDED
Status: DISCONTINUED | OUTPATIENT
Start: 2019-08-29 | End: 2019-08-29 | Stop reason: HOSPADM

## 2019-08-29 RX ORDER — MIDAZOLAM HYDROCHLORIDE 1 MG/ML
INJECTION, SOLUTION INTRAMUSCULAR; INTRAVENOUS
Status: DISCONTINUED | OUTPATIENT
Start: 2019-08-29 | End: 2019-08-29

## 2019-08-29 RX ORDER — PROPOFOL 10 MG/ML
VIAL (ML) INTRAVENOUS CONTINUOUS PRN
Status: DISCONTINUED | OUTPATIENT
Start: 2019-08-29 | End: 2019-08-29

## 2019-08-29 RX ORDER — HYDROMORPHONE HYDROCHLORIDE 1 MG/ML
0.2 INJECTION, SOLUTION INTRAMUSCULAR; INTRAVENOUS; SUBCUTANEOUS EVERY 5 MIN PRN
Status: DISCONTINUED | OUTPATIENT
Start: 2019-08-29 | End: 2019-08-29 | Stop reason: HOSPADM

## 2019-08-29 RX ORDER — MEPERIDINE HYDROCHLORIDE 50 MG/ML
12.5 INJECTION INTRAMUSCULAR; INTRAVENOUS; SUBCUTANEOUS ONCE AS NEEDED
Status: DISCONTINUED | OUTPATIENT
Start: 2019-08-29 | End: 2019-08-29 | Stop reason: HOSPADM

## 2019-08-29 RX ORDER — SODIUM CHLORIDE 0.9 % (FLUSH) 0.9 %
10 SYRINGE (ML) INJECTION
Status: DISCONTINUED | OUTPATIENT
Start: 2019-08-29 | End: 2019-08-29 | Stop reason: HOSPADM

## 2019-08-29 RX ADMIN — SODIUM CHLORIDE: 0.9 INJECTION, SOLUTION INTRAVENOUS at 07:08

## 2019-08-29 RX ADMIN — PROPOFOL 50 MG: 10 INJECTION, EMULSION INTRAVENOUS at 07:08

## 2019-08-29 RX ADMIN — MIDAZOLAM HYDROCHLORIDE 2 MG: 1 INJECTION, SOLUTION INTRAMUSCULAR; INTRAVENOUS at 07:08

## 2019-08-29 RX ADMIN — LIDOCAINE HYDROCHLORIDE 100 MG: 20 INJECTION, SOLUTION INTRAVENOUS at 07:08

## 2019-08-29 RX ADMIN — PROPOFOL 70 MG: 10 INJECTION, EMULSION INTRAVENOUS at 07:08

## 2019-08-29 RX ADMIN — PROPOFOL 200 MCG/KG/MIN: 10 INJECTION, EMULSION INTRAVENOUS at 07:08

## 2019-08-29 NOTE — ANESTHESIA PREPROCEDURE EVALUATION
08/29/2019  Wallace Gonzalez is a 44 y.o., male.    Anesthesia Evaluation         Review of Systems  Anesthesia Hx:  No problems with previous Anesthesia    Social:  Non-Smoker    Cardiovascular:   Exercise tolerance: good Hypertension Denies CAD.     Denies Angina.  Functional Capacity Can you climb two flights of stairs? ==> Yes    Pulmonary:   Asthma Denies Recent URI.  Denies Sleep Apnea.    Renal/:  Renal/ Normal     Hepatic/GI:   Denies PUD. Denies Hiatal Hernia. GERD Denies Liver Disease.  Denies Hepatitis.    Neurological:   Denies CVA. Denies Seizures.    Endocrine:   Diabetes Denies Hypothyroidism.        Physical Exam  General:  Well nourished    Airway/Jaw/Neck:  Airway Findings: Mouth Opening: Normal Tongue: Normal  General Airway Assessment: Adult  Mallampati: III  Improves to II with phonation.  TM Distance: Normal, at least 6 cm  Jaw/Neck Findings:  Neck ROM: Normal ROM  Neck Findings:     Eyes/Ears/Nose:  EYES/EARS/NOSE FINDINGS: Normal   Dental:  Dental Findings: In tact   Chest/Lungs:  Chest/Lungs Findings: Clear to auscultation     Heart/Vascular:  Heart Findings: Rate: Normal  Rhythm: Regular Rhythm  Sounds: Normal        Mental Status:  Mental Status Findings:  Alert and Oriented         Anesthesia Plan  Type of Anesthesia, risks & benefits discussed:  Anesthesia Type:  general  Patient's Preference: Proceed with anesthesia understanding that the risks are very small but could be serious or life threatening.  Intra-op Monitoring Plan: standard ASA monitors  Intra-op Monitoring Plan Comments:   Post Op Pain Control Plan:   Post Op Pain Control Plan Comments:   Induction:   IV  Beta Blocker:  Patient is not currently on a Beta-Blocker (No further documentation required).       Informed Consent: Patient understands risks and agrees with Anesthesia plan.  Questions answered. Anesthesia  consent signed with patient.  ASA Score: 2     Day of Surgery Review of History & Physical: I have interviewed and examined the patient. I have reviewed the patient's H&P dated:            Ready For Surgery From Anesthesia Perspective.

## 2019-08-29 NOTE — PLAN OF CARE
Pt spoke with ENDO. Pt wob unlabored. Pt denies any nausea or pain at this time. Pt have steady gait. Pt verbalized understanding of discharge instructions, spouse at bedside. Pt consent forms from procedure in chart

## 2019-08-29 NOTE — TRANSFER OF CARE
"Anesthesia Transfer of Care Note    Patient: Wallace Gonzalez    Procedure(s) Performed: Procedure(s) (LRB):  ULTRASOUND, UPPER GI TRACT, ENDOSCOPIC (N/A)    Patient location: [facility]    Anesthesia Type: general    Transport from OR: Transported from OR on 6-10 L/min O2 by face mask with adequate spontaneous ventilation    Post pain: adequate analgesia    Post assessment: no apparent anesthetic complications and tolerated procedure well    Post vital signs: stable    Level of consciousness: sedated and responds to stimulation    Nausea/Vomiting: no nausea/vomiting    Complications: none    Transfer of care protocol was followed      Last vitals:   Visit Vitals  /83 (BP Location: Left arm, Patient Position: Lying)   Pulse (P) 83   Temp (P) 36.7 °C (98.1 °F) (Temporal)   Resp (!) (P) 22   Ht 6' 2" (1.88 m)   Wt 95.3 kg (210 lb)   SpO2 (P) 99%   BMI 26.96 kg/m²     "

## 2019-08-29 NOTE — [employer] PATIENT INSTRUCTIONS
Discharge Summary/Instructions after an Endoscopic Procedure  Patient Name: Wallace Gonzalez  Patient MRN: [id number]  Patient YOB: 1975  Thursday, August 29, 2019  Aureliano Lyn MD  RESTRICTIONS:  During your procedure today, you received medications for sedation.  These   medications may affect your judgment, balance and coordination.  Therefore,   for 24 hours, you have the following restrictions:   - DO NOT drive a car, operate machinery, make legal/financial decisions,   sign important papers or drink alcohol.    ACTIVITY:  Today: no heavy lifting, straining or running due to procedural   sedation/anesthesia.  The following day: return to full activity including work.  DIET:  Eat and drink normally unless instructed otherwise.     TREATMENT FOR COMMON SIDE EFFECTS:  - Mild abdominal pain, nausea, belching, bloating or excessive gas:  rest,   eat lightly and use a heating pad.  - Sore Throat: treat with throat lozenges and/or gargle with warm salt   water.  - Because air was used during the procedure, expelling large amounts of air   from your rectum or belching is normal.  - If a bowel prep was taken, you may not have a bowel movement for 1-3 days.    This is normal.  SYMPTOMS TO WATCH FOR AND REPORT TO YOUR PHYSICIAN:  1. Abdominal pain or bloating, other than gas cramps.  2. Chest pain.  3. Back pain.  4. Signs of infection such as: chills or fever occurring within 24 hours   after the procedure.  5. Rectal bleeding, which would show as bright red, maroon, or black stools.   (A tablespoon of blood from the rectum is not serious, especially if   hemorrhoids are present.)  6. Vomiting.  7. Weakness or dizziness.  GO DIRECTLY TO THE NEAREST EMERGENCY ROOM IF YOU HAVE ANY OF THE FOLLOWING:      Difficulty breathing              Chills and/or fever over 101 F   Persistent vomiting and/or vomiting blood   Severe abdominal pain   Severe chest pain   Black, tarry stools   Bleeding- more than one  tablespoon   Any other symptom or condition that you feel may need urgent attention  Your doctor recommends these additional instructions:  If any biopsies were taken, your doctors clinic will contact you in 1 to 2   weeks with any results.  - Discharge patient to home.   - Resume previous diet.   - Await cytology results.   - Continue present medications.  For questions, problems or results please call your physician - Aureliano Lyn MD at Work:  [phone].  [facility], EMERGENCY ROOM PHONE NUMBER: [phone]  IF A COMPLICATION OR EMERGENCY SITUATION ARISES AND YOU ARE UNABLE TO REACH   YOUR PHYSICIAN - GO DIRECTLY TO THE EMERGENCY ROOM.  Aureliano Lyn MD  8/29/2019 8:56:19 AM  This report has been verified and signed electronically.  [employer]

## 2019-08-29 NOTE — DISCHARGE INSTRUCTIONS
Recovery After Procedural Sedation (Adult)  You have been given medicine by vein to make you sleep during your surgery. This may have included both a pain medicine and sleeping medicine. Most of the effects have worn off. But you may still have some drowsiness for the next 6 to 8 hours.  Home care  Follow these guidelines when you get home:  · For the next 8 hours, you should be watched by a responsible adult. This person should make sure your condition is not getting worse.  · Don't drink any alcohol for the next 24 hours.  · Don't drive, operate dangerous machinery, or make important business or personal decisions during the next 24 hours.  Note: Your healthcare provider may tell you not to take any medicine by mouth for pain or sleep in the next 4 hours. These medicines may react with the medicines you were given in the hospital. This could cause a much stronger response than usual.  Follow-up care  Follow up with your healthcare provider if you are not alert and back to your usual level of activity within 12 hours.  When to seek medical advice  Call your healthcare provider right away if any of these occur:  · Drowsiness gets worse  · Weakness or dizziness gets worse  · Repeated vomiting  · You can't be awakened   Date Last Reviewed: 10/18/2016  © 0640-8884 [employer]. [address] [place] [address], [place] [address]. All rights reserved. This information is not intended as a substitute for professional medical care. Always follow your healthcare professional's instructions.

## 2019-08-29 NOTE — ANESTHESIA RELEASE NOTE
Anesthesia Release from PACU Note    Patient: Wallace Gonzalez    Procedure(s) Performed: Procedure(s) (LRB):  ULTRASOUND, UPPER GI TRACT, ENDOSCOPIC (N/A)    Anesthesia type: General    Post pain: Adequate analgesia    Post assessment: no apparent anesthetic complications    Last Vitals:   Vitals:    08/29/19 0907   BP:    Pulse: 75   Resp: 14   Temp:    SpO2: 100%       Post vital signs: stable    Level of consciousness: awake    Complications: none    Airway Patency: patent    Respiratory: spontaneous    Cardiovascular: stable    Hydration: euvolemic

## 2019-08-29 NOTE — H&P
Short Stay Endoscopy History and Physical    PCP - Roberto Carlos Nava Jr, MD  Referring Physician - Antonio Walters MD  [address] [place] [address]    Procedure - eus  ASA - per anesthesia  Mallampati - per anesthesia  History of Anesthesia problems - no  Family history Anesthesia problems -  no   Plan of anesthesia - General    HPI:  This is a 44 y.o. male here for evaluation of: pancreas lesion    Reflux - no  Dysphagia - no  Abdominal pain - no  Diarrhea - no    ROS:  Constitutional: No fevers, chills, No weight loss  CV: No chest pain  Pulm: No cough, No shortness of breath  Ophtho: No vision changes  GI: see HPI  Derm: No rash    Medical History:  has a past medical history of Bronchitis, Diabetic peripheral neuropathy, DM2 (diabetes mellitus, type 2), HTN (hypertension), Hypercholesterolemia, Pancreatic duct stones, Pancreatic lesion, and Pancreatitis.    Surgical History:  has a past surgical history that includes Endoscopic ultrasound of upper gastrointestinal tract (N/A, 2/7/2019); Endoscopic ultrasound of upper gastrointestinal tract (N/A, 4/5/2019); and Endoscopic ultrasound of upper gastrointestinal tract (N/A, 6/13/2019).    Family History: family history is not on file..    Social History:  reports that he has been smoking cigarettes.  He does not have any smokeless tobacco history on file. He reports that he drinks alcohol. He reports that he has current or past drug history.    Review of patient's allergies indicates:  No Known Allergies    Medications:   Medications Prior to Admission   Medication Sig Dispense Refill Last Dose    amitriptyline (ELAVIL) 50 MG tablet Take 50 mg by mouth every evening.   8/28/2019 at Unknown time    cloNIDine (CATAPRES) 0.1 MG tablet   5 8/28/2019 at Unknown time    gabapentin (NEURONTIN) 600 MG tablet Take 600 mg by mouth 3 (three) times daily.   8/29/2019 at Unknown time    HYDROcodone-acetaminophen (NORCO)  mg per tablet   0  8/28/2019 at Unknown time    insulin glargine,hum.rec.anlog (BASAGLAR KWIKPEN U-100 INSULIN SUBQ) Inject 20 Units into the skin every evening.   8/28/2019 at Unknown time    lipase-protease-amylase 12,000-38,000-60,000 units (CREON) CpDR Take by mouth.   8/29/2019 at Unknown time    lisinopril (PRINIVIL,ZESTRIL) 5 MG tablet Take 5 mg by mouth once daily.   8/29/2019 at Unknown time    meloxicam (MOBIC) 15 MG tablet   2 Past Month at Unknown time    metFORMIN (GLUCOPHAGE) 1000 MG tablet Take 1,000 mg by mouth daily with breakfast.   8/29/2019 at Unknown time    metoprolol succinate (TOPROL-XL) 50 MG 24 hr tablet   5 8/28/2019 at Unknown time    omeprazole (PRILOSEC) 20 MG capsule Take 1 capsule (20 mg total) by mouth once daily. 30 capsule 1 8/29/2019 at Unknown time    rosuvastatin calcium (ROSUVASTATIN ORAL) Take 1 tablet by mouth every evening.   Past Month at Unknown time    albuterol 90 mcg/actuation inhaler Inhale 2 puffs into the lungs every 6 (six) hours as needed for Wheezing.   More than a month at Unknown time    omeprazole (PRILOSEC) 40 MG capsule   4     ondansetron (ZOFRAN-ODT) 4 MG TbDL Take 8 mg by mouth every 8 (eight) hours.   More than a month at Unknown time       Physical Exam:    Vital Signs:   Vitals:    08/29/19 0707   BP: 129/83   Pulse: 85   Resp: 18   Temp: 98.2 °F (36.8 °C)       General Appearance: Well appearing in no acute distress    Labs:  Lab Results   Component Value Date    WBC 2.96 (L) 12/17/2014    HGB 12.3 (L) 12/17/2014    HCT 37.1 (L) 12/17/2014     12/17/2014    CHOL 194 12/12/2014    TRIG 72 12/12/2014    HDL 49 12/12/2014    ALT 24 12/11/2014    AST 35 12/11/2014     12/17/2014    K 3.9 12/17/2014     12/17/2014    CREATININE 0.8 12/17/2014    BUN 3 (L) 12/17/2014    CO2 24 12/17/2014       I have explained the risks and benefits of this endoscopic procedure to the patient including but not limited to bleeding, inflammation, infection,  perforation, and death.      Aureliano Lyn MD

## 2019-08-29 NOTE — ANESTHESIA POSTPROCEDURE EVALUATION
Anesthesia Post Evaluation    Patient: Wallace Gonzalez    Procedure(s) Performed: Procedure(s) (LRB):  ULTRASOUND, UPPER GI TRACT, ENDOSCOPIC (N/A)    Final Anesthesia Type: general  Patient location during evaluation: PACU  Patient participation: Yes- Able to Participate  Level of consciousness: awake and alert  Post-procedure vital signs: reviewed and stable  Pain management: adequate  Airway patency: patent  PONV status at discharge: No PONV  Anesthetic complications: no      Cardiovascular status: blood pressure returned to baseline  Respiratory status: unassisted  Hydration status: euvolemic  Follow-up not needed.          Vitals Value Taken Time   /70 8/29/2019  9:30 AM   Temp 36.8 °C (98.2 °F) 8/29/2019  9:30 AM   Pulse 87 8/29/2019  9:30 AM   Resp 18 8/29/2019  9:30 AM   SpO2 100 % 8/29/2019  9:30 AM         No case tracking events are documented in the log.      Pain/Roberto Score: Roberto Score: 10 (8/29/2019  9:30 AM)

## 2019-09-06 ENCOUNTER — TELEPHONE (OUTPATIENT)
Dept: GASTROENTEROLOGY | Facility: HOSPITAL | Age: 44
End: 2019-09-06

## 2019-09-06 ENCOUNTER — TELEPHONE (OUTPATIENT)
Dept: GASTROENTEROLOGY | Facility: CLINIC | Age: 44
End: 2019-09-06

## 2019-09-06 NOTE — TELEPHONE ENCOUNTER
----- Message from Lorna Houser sent at 9/6/2019  2:33 PM CDT -----  Contact: [phone]/self  Patient requesting to speak with you concerning test results   Please call back to assist at [phone]

## 2019-09-06 NOTE — TELEPHONE ENCOUNTER
The biopsies were all benign.i'd suggest repeat imaging in 6 months. He can follow up with us in clinic to discuss

## 2019-09-09 ENCOUNTER — TELEPHONE (OUTPATIENT)
Dept: GASTROENTEROLOGY | Facility: HOSPITAL | Age: 44
End: 2019-09-09

## 2019-09-09 NOTE — TELEPHONE ENCOUNTER
No abnormal tissue was noted in the specimen.  We should repeat imaging in 3-4 months    I've relayed these results and recommendations to him

## 2019-11-06 ENCOUNTER — TELEPHONE (OUTPATIENT)
Dept: SURGERY | Facility: CLINIC | Age: 44
End: 2019-11-06

## 2019-11-06 ENCOUNTER — TELEPHONE (OUTPATIENT)
Dept: ENDOSCOPY | Facility: HOSPITAL | Age: 44
End: 2019-11-06

## 2019-11-06 NOTE — TELEPHONE ENCOUNTER
Called to schedule f/u appt.  Will discuss with Dr. Walters if he needs any scans and what kind.  Will call him after talking to him Thursday.  Agreeable.

## 2019-11-06 NOTE — TELEPHONE ENCOUNTER
----- Message from Cheyanne Gray sent at 11/6/2019 11:02 AM CST -----  Contact: Pt  Pt is requesting a callback at [phone] to get his 4 month visit scheduled

## 2019-11-06 NOTE — TELEPHONE ENCOUNTER
----- Message from Roberto Carlos Toussaint sent at 11/6/2019 12:02 PM CST -----  Contact: Pt  Type:  Needs Medical Advice    Who Called: The Pt was returning So's call and would like a call back please.    Best Call Back Number: [phone]

## 2019-11-07 ENCOUNTER — TELEPHONE (OUTPATIENT)
Dept: SURGERY | Facility: CLINIC | Age: 44
End: 2019-11-07

## 2019-11-12 ENCOUNTER — TELEPHONE (OUTPATIENT)
Dept: ENDOSCOPY | Facility: HOSPITAL | Age: 44
End: 2019-11-12

## 2019-11-12 DIAGNOSIS — K85.90 ACUTE PANCREATITIS, UNSPECIFIED COMPLICATION STATUS, UNSPECIFIED PANCREATITIS TYPE: Primary | ICD-10-CM

## 2019-11-12 DIAGNOSIS — R93.89 ABNORMAL MRI: ICD-10-CM

## 2019-11-15 ENCOUNTER — TELEPHONE (OUTPATIENT)
Dept: ENDOSCOPY | Facility: HOSPITAL | Age: 44
End: 2019-11-15

## 2019-11-20 ENCOUNTER — TELEPHONE (OUTPATIENT)
Dept: ENDOSCOPY | Facility: HOSPITAL | Age: 44
End: 2019-11-20

## 2019-12-18 ENCOUNTER — HOSPITAL ENCOUNTER (OUTPATIENT)
Dept: RADIOLOGY | Facility: HOSPITAL | Age: 44
Discharge: HOME | End: 2019-12-18
Attending: INTERNAL MEDICINE
Payer: MEDICARE

## 2019-12-18 DIAGNOSIS — K85.90 ACUTE PANCREATITIS, UNSPECIFIED COMPLICATION STATUS, UNSPECIFIED PANCREATITIS TYPE: ICD-10-CM

## 2019-12-18 DIAGNOSIS — R93.89 ABNORMAL MRI: ICD-10-CM

## 2019-12-18 PROCEDURE — 76376 3D RENDER W/INTRP POSTPROCES: CPT | Mod: TC

## 2019-12-18 PROCEDURE — 25500020 PHARM REV CODE 255: Performed by: INTERNAL MEDICINE

## 2019-12-18 PROCEDURE — A9585 GADOBUTROL INJECTION: HCPCS | Performed by: INTERNAL MEDICINE

## 2019-12-18 PROCEDURE — 74183 MRI ABD W/O CNTR FLWD CNTR: CPT | Mod: 26,,, | Performed by: RADIOLOGY

## 2019-12-18 PROCEDURE — 76376 3D RENDER W/INTRP POSTPROCES: CPT | Mod: 26,,, | Performed by: RADIOLOGY

## 2019-12-18 PROCEDURE — 74183 MRI ABDOMEN WITH AND WO_INC MRCP: ICD-10-PCS | Mod: 26,,, | Performed by: RADIOLOGY

## 2019-12-18 PROCEDURE — 76376 MRI ABDOMEN WITH AND WO_INC MRCP: ICD-10-PCS | Mod: 26,,, | Performed by: RADIOLOGY

## 2019-12-18 RX ORDER — GADOBUTROL 604.72 MG/ML
10 INJECTION INTRAVENOUS
Status: COMPLETED | OUTPATIENT
Start: 2019-12-18 | End: 2019-12-18

## 2019-12-18 RX ADMIN — GADOBUTROL 10 ML: 604.72 INJECTION INTRAVENOUS at 10:12

## 2020-03-30 ENCOUNTER — TELEPHONE (OUTPATIENT)
Dept: GASTROENTEROLOGY | Facility: CLINIC | Age: 45
End: 2020-03-30

## 2020-03-30 NOTE — TELEPHONE ENCOUNTER
----- Message from Michelle Garcia sent at 3/30/2020  2:11 PM CDT -----  Contact: pt  Pt would like to be called back regarding his appt that he wants to r/s    Pt can be reached at  [phone]

## 2020-03-31 ENCOUNTER — TELEPHONE (OUTPATIENT)
Dept: ENDOSCOPY | Facility: HOSPITAL | Age: 45
End: 2020-03-31

## 2020-05-20 ENCOUNTER — TELEPHONE (OUTPATIENT)
Dept: ENDOSCOPY | Facility: HOSPITAL | Age: 45
End: 2020-05-20

## 2020-05-20 NOTE — TELEPHONE ENCOUNTER
Attempted to contact patient to reschedule appointment that was previously canceled due to COVID19. Left message

## 2020-08-13 ENCOUNTER — OFFICE VISIT (OUTPATIENT)
Dept: GASTROENTEROLOGY | Facility: CLINIC | Age: 45
End: 2020-08-13
Payer: MEDICARE

## 2020-08-13 ENCOUNTER — HOSPITAL ENCOUNTER (INPATIENT)
Facility: HOSPITAL | Age: 45
LOS: 2 days | Discharge: HOME | DRG: 683 | End: 2020-08-15
Attending: EMERGENCY MEDICINE | Admitting: FAMILY MEDICINE
Payer: MEDICARE

## 2020-08-13 VITALS
WEIGHT: 218.94 LBS | HEART RATE: 90 BPM | BODY MASS INDEX: 28.11 KG/M2 | DIASTOLIC BLOOD PRESSURE: 55 MMHG | SYSTOLIC BLOOD PRESSURE: 84 MMHG

## 2020-08-13 DIAGNOSIS — N17.9 ACUTE RENAL FAILURE: ICD-10-CM

## 2020-08-13 DIAGNOSIS — R53.1 GENERALIZED WEAKNESS: ICD-10-CM

## 2020-08-13 DIAGNOSIS — R42 DIZZINESS: ICD-10-CM

## 2020-08-13 DIAGNOSIS — R07.9 CHEST PAIN: ICD-10-CM

## 2020-08-13 DIAGNOSIS — E87.5 HYPERKALEMIA: ICD-10-CM

## 2020-08-13 DIAGNOSIS — I95.9 HYPOTENSION, UNSPECIFIED HYPOTENSION TYPE: Primary | ICD-10-CM

## 2020-08-13 DIAGNOSIS — K86.2 PANCREAS CYST: ICD-10-CM

## 2020-08-13 DIAGNOSIS — R11.2 NON-INTRACTABLE VOMITING WITH NAUSEA, UNSPECIFIED VOMITING TYPE: Primary | ICD-10-CM

## 2020-08-13 DIAGNOSIS — K86.1 CHRONIC PANCREATITIS, UNSPECIFIED PANCREATITIS TYPE: ICD-10-CM

## 2020-08-13 PROBLEM — R19.7 DIARRHEA: Status: ACTIVE | Noted: 2020-08-13

## 2020-08-13 PROBLEM — R10.9 ABDOMINAL PAIN: Status: ACTIVE | Noted: 2020-08-13

## 2020-08-13 PROBLEM — I10 ESSENTIAL HYPERTENSION: Status: ACTIVE | Noted: 2020-08-13

## 2020-08-13 PROBLEM — E11.9 TYPE 2 DIABETES MELLITUS: Status: ACTIVE | Noted: 2020-08-13

## 2020-08-13 PROBLEM — E87.1 HYPONATREMIA: Status: ACTIVE | Noted: 2020-08-13

## 2020-08-13 LAB
ALBUMIN SERPL BCP-MCNC: 3.5 G/DL (ref 3.5–5.2)
ALBUMIN SERPL BCP-MCNC: 3.8 G/DL (ref 3.5–5.2)
ALBUMIN SERPL BCP-MCNC: 4.2 G/DL (ref 3.5–5.2)
ALBUMIN SERPL BCP-MCNC: 4.3 G/DL (ref 3.5–5.2)
ALLENS TEST: ABNORMAL
ALP SERPL-CCNC: 112 U/L (ref 55–135)
ALP SERPL-CCNC: 113 U/L (ref 55–135)
ALT SERPL W/O P-5'-P-CCNC: 19 U/L (ref 10–44)
ALT SERPL W/O P-5'-P-CCNC: 19 U/L (ref 10–44)
ANION GAP SERPL CALC-SCNC: 16 MMOL/L (ref 8–16)
ANION GAP SERPL CALC-SCNC: 17 MMOL/L (ref 8–16)
ANION GAP SERPL CALC-SCNC: 17 MMOL/L (ref 8–16)
ANION GAP SERPL CALC-SCNC: 18 MMOL/L (ref 8–16)
ANION GAP SERPL CALC-SCNC: 19 MMOL/L (ref 8–16)
AST SERPL-CCNC: 18 U/L (ref 10–40)
AST SERPL-CCNC: 18 U/L (ref 10–40)
BACTERIA #/AREA URNS HPF: ABNORMAL /HPF
BASOPHILS # BLD AUTO: 0.03 K/UL (ref 0–0.2)
BASOPHILS NFR BLD: 0.3 % (ref 0–1.9)
BILIRUB SERPL-MCNC: 0.3 MG/DL (ref 0.1–1)
BILIRUB SERPL-MCNC: 0.4 MG/DL (ref 0.1–1)
BILIRUB UR QL STRIP: NEGATIVE
BILIRUB UR QL STRIP: NEGATIVE
BUN SERPL-MCNC: 83 MG/DL (ref 6–20)
BUN SERPL-MCNC: 85 MG/DL (ref 6–20)
BUN SERPL-MCNC: 85 MG/DL (ref 6–20)
BUN SERPL-MCNC: 87 MG/DL (ref 6–20)
BUN SERPL-MCNC: 87 MG/DL (ref 6–20)
CALCIUM SERPL-MCNC: 7.8 MG/DL (ref 8.7–10.5)
CALCIUM SERPL-MCNC: 7.8 MG/DL (ref 8.7–10.5)
CALCIUM SERPL-MCNC: 8 MG/DL (ref 8.7–10.5)
CALCIUM SERPL-MCNC: 8.2 MG/DL (ref 8.7–10.5)
CALCIUM SERPL-MCNC: 8.5 MG/DL (ref 8.7–10.5)
CHLORIDE SERPL-SCNC: 91 MMOL/L (ref 95–110)
CHLORIDE SERPL-SCNC: 94 MMOL/L (ref 95–110)
CHLORIDE SERPL-SCNC: 97 MMOL/L (ref 95–110)
CHLORIDE SERPL-SCNC: 97 MMOL/L (ref 95–110)
CHLORIDE SERPL-SCNC: 98 MMOL/L (ref 95–110)
CK SERPL-CCNC: 700 U/L (ref 20–200)
CLARITY UR: CLEAR
CLARITY UR: CLEAR
CO2 SERPL-SCNC: 13 MMOL/L (ref 23–29)
CO2 SERPL-SCNC: 16 MMOL/L (ref 23–29)
CO2 SERPL-SCNC: 17 MMOL/L (ref 23–29)
COLOR UR: YELLOW
COLOR UR: YELLOW
CREAT SERPL-MCNC: 10.6 MG/DL (ref 0.5–1.4)
CREAT SERPL-MCNC: 11.8 MG/DL (ref 0.5–1.4)
CREAT SERPL-MCNC: 11.8 MG/DL (ref 0.5–1.4)
CREAT SERPL-MCNC: 13.2 MG/DL (ref 0.5–1.4)
CREAT SERPL-MCNC: 14.2 MG/DL (ref 0.5–1.4)
CREAT UR-MCNC: 213.5 MG/DL (ref 23–375)
DIFFERENTIAL METHOD: ABNORMAL
EOSINOPHIL # BLD AUTO: 0.1 K/UL (ref 0–0.5)
EOSINOPHIL NFR BLD: 0.8 % (ref 0–8)
ERYTHROCYTE [DISTWIDTH] IN BLOOD BY AUTOMATED COUNT: 14.6 % (ref 11.5–14.5)
EST. GFR  (AFRICAN AMERICAN): 4 ML/MIN/1.73 M^2
EST. GFR  (AFRICAN AMERICAN): 5 ML/MIN/1.73 M^2
EST. GFR  (AFRICAN AMERICAN): 6 ML/MIN/1.73 M^2
EST. GFR  (NON AFRICAN AMERICAN): 4 ML/MIN/1.73 M^2
EST. GFR  (NON AFRICAN AMERICAN): 4 ML/MIN/1.73 M^2
EST. GFR  (NON AFRICAN AMERICAN): 5 ML/MIN/1.73 M^2
ESTIMATED AVG GLUCOSE: 258 MG/DL (ref 68–131)
GLUCOSE SERPL-MCNC: 127 MG/DL (ref 70–110)
GLUCOSE SERPL-MCNC: 127 MG/DL (ref 70–110)
GLUCOSE SERPL-MCNC: 173 MG/DL (ref 70–110)
GLUCOSE SERPL-MCNC: 179 MG/DL (ref 70–110)
GLUCOSE SERPL-MCNC: 207 MG/DL (ref 70–110)
GLUCOSE UR QL STRIP: NEGATIVE
GLUCOSE UR QL STRIP: NEGATIVE
HBA1C MFR BLD HPLC: 10.6 % (ref 4–5.6)
HCO3 UR-SCNC: 18.7 MMOL/L (ref 24–28)
HCT VFR BLD AUTO: 34.3 % (ref 40–54)
HGB BLD-MCNC: 11.5 G/DL (ref 14–18)
HGB UR QL STRIP: ABNORMAL
HGB UR QL STRIP: ABNORMAL
HYALINE CASTS #/AREA URNS LPF: 0 /LPF
IMM GRANULOCYTES # BLD AUTO: 0.05 K/UL (ref 0–0.04)
IMM GRANULOCYTES NFR BLD AUTO: 0.6 % (ref 0–0.5)
KETONES UR QL STRIP: NEGATIVE
KETONES UR QL STRIP: NEGATIVE
LACTATE SERPL-SCNC: 1.6 MMOL/L (ref 0.5–2.2)
LEUKOCYTE ESTERASE UR QL STRIP: NEGATIVE
LEUKOCYTE ESTERASE UR QL STRIP: NEGATIVE
LIPASE SERPL-CCNC: 39 U/L (ref 4–60)
LYMPHOCYTES # BLD AUTO: 1.1 K/UL (ref 1–4.8)
LYMPHOCYTES NFR BLD: 12.2 % (ref 18–48)
MAGNESIUM SERPL-MCNC: 1.6 MG/DL (ref 1.6–2.6)
MCH RBC QN AUTO: 28.5 PG (ref 27–31)
MCHC RBC AUTO-ENTMCNC: 33.5 G/DL (ref 32–36)
MCV RBC AUTO: 85 FL (ref 82–98)
MICROSCOPIC COMMENT: ABNORMAL
MONOCYTES # BLD AUTO: 1 K/UL (ref 0.3–1)
MONOCYTES NFR BLD: 11.2 % (ref 4–15)
NEUTROPHILS # BLD AUTO: 6.8 K/UL (ref 1.8–7.7)
NEUTROPHILS NFR BLD: 74.9 % (ref 38–73)
NITRITE UR QL STRIP: NEGATIVE
NITRITE UR QL STRIP: NEGATIVE
NRBC BLD-RTO: 0 /100 WBC
OSMOLALITY UR: 336 MOSM/KG (ref 50–1200)
PCO2 BLDA: 44.9 MMHG (ref 35–45)
PH SMN: 7.23 [PH] (ref 7.35–7.45)
PH UR STRIP: 6 [PH] (ref 5–8)
PH UR STRIP: 6 [PH] (ref 5–8)
PHOSPHATE SERPL-MCNC: 6.4 MG/DL (ref 2.7–4.5)
PHOSPHATE SERPL-MCNC: 6.6 MG/DL (ref 2.7–4.5)
PLATELET # BLD AUTO: 273 K/UL (ref 150–350)
PMV BLD AUTO: 9.8 FL (ref 9.2–12.9)
PO2 BLDA: 31 MMHG (ref 40–60)
POC BE: -9 MMOL/L
POC SATURATED O2: 48 % (ref 95–100)
POC TCO2: 20 MMOL/L (ref 24–29)
POCT GLUCOSE: 151 MG/DL (ref 70–110)
POCT GLUCOSE: 198 MG/DL (ref 70–110)
POTASSIUM SERPL-SCNC: 5.1 MMOL/L (ref 3.5–5.1)
POTASSIUM SERPL-SCNC: 5.2 MMOL/L (ref 3.5–5.1)
POTASSIUM SERPL-SCNC: 5.2 MMOL/L (ref 3.5–5.1)
POTASSIUM SERPL-SCNC: 6.8 MMOL/L (ref 3.5–5.1)
POTASSIUM SERPL-SCNC: 7.2 MMOL/L (ref 3.5–5.1)
PROT SERPL-MCNC: 8.6 G/DL (ref 6–8.4)
PROT SERPL-MCNC: 8.9 G/DL (ref 6–8.4)
PROT UR QL STRIP: ABNORMAL
PROT UR QL STRIP: ABNORMAL
RBC # BLD AUTO: 4.04 M/UL (ref 4.6–6.2)
RBC #/AREA URNS HPF: 5 /HPF (ref 0–4)
SAMPLE: ABNORMAL
SARS-COV-2 RDRP RESP QL NAA+PROBE: NEGATIVE
SITE: ABNORMAL
SODIUM SERPL-SCNC: 125 MMOL/L (ref 136–145)
SODIUM SERPL-SCNC: 127 MMOL/L (ref 136–145)
SODIUM SERPL-SCNC: 130 MMOL/L (ref 136–145)
SODIUM SERPL-SCNC: 131 MMOL/L (ref 136–145)
SODIUM SERPL-SCNC: 131 MMOL/L (ref 136–145)
SODIUM UR-SCNC: 41 MMOL/L (ref 20–250)
SP GR UR STRIP: >=1.03 (ref 1–1.03)
SP GR UR STRIP: >=1.03 (ref 1–1.03)
TROPONIN I SERPL DL<=0.01 NG/ML-MCNC: <0.006 NG/ML (ref 0–0.03)
TSH SERPL DL<=0.005 MIU/L-ACNC: 1.98 UIU/ML (ref 0.4–4)
URN SPEC COLLECT METH UR: ABNORMAL
URN SPEC COLLECT METH UR: ABNORMAL
UROBILINOGEN UR STRIP-ACNC: NEGATIVE EU/DL
UROBILINOGEN UR STRIP-ACNC: NEGATIVE EU/DL
WBC # BLD AUTO: 9.07 K/UL (ref 3.9–12.7)
WBC #/AREA URNS HPF: 3 /HPF (ref 0–5)

## 2020-08-13 PROCEDURE — 83935 ASSAY OF URINE OSMOLALITY: CPT

## 2020-08-13 PROCEDURE — 63600175 PHARM REV CODE 636 W HCPCS: Performed by: EMERGENCY MEDICINE

## 2020-08-13 PROCEDURE — 36415 COLL VENOUS BLD VENIPUNCTURE: CPT

## 2020-08-13 PROCEDURE — 96375 TX/PRO/DX INJ NEW DRUG ADDON: CPT

## 2020-08-13 PROCEDURE — 83690 ASSAY OF LIPASE: CPT

## 2020-08-13 PROCEDURE — 80069 RENAL FUNCTION PANEL: CPT

## 2020-08-13 PROCEDURE — 80053 COMPREHEN METABOLIC PANEL: CPT | Mod: 91

## 2020-08-13 PROCEDURE — 80053 COMPREHEN METABOLIC PANEL: CPT

## 2020-08-13 PROCEDURE — 99212 OFFICE O/P EST SF 10 MIN: CPT | Mod: PBBFAC,PO | Performed by: INTERNAL MEDICINE

## 2020-08-13 PROCEDURE — 99900035 HC TECH TIME PER 15 MIN (STAT)

## 2020-08-13 PROCEDURE — 25000003 PHARM REV CODE 250: Performed by: PHYSICIAN ASSISTANT

## 2020-08-13 PROCEDURE — 25000003 PHARM REV CODE 250: Performed by: FAMILY MEDICINE

## 2020-08-13 PROCEDURE — 63600175 PHARM REV CODE 636 W HCPCS: Performed by: FAMILY MEDICINE

## 2020-08-13 PROCEDURE — 99213 PR OFFICE/OUTPT VISIT, EST, LEVL III, 20-29 MIN: ICD-10-PCS | Mod: S$PBB,,, | Performed by: INTERNAL MEDICINE

## 2020-08-13 PROCEDURE — C9399 UNCLASSIFIED DRUGS OR BIOLOG: HCPCS | Performed by: FAMILY MEDICINE

## 2020-08-13 PROCEDURE — 82550 ASSAY OF CK (CPK): CPT

## 2020-08-13 PROCEDURE — 82570 ASSAY OF URINE CREATININE: CPT

## 2020-08-13 PROCEDURE — 93010 EKG 12-LEAD: ICD-10-PCS | Mod: ,,, | Performed by: INTERNAL MEDICINE

## 2020-08-13 PROCEDURE — 85025 COMPLETE CBC W/AUTO DIFF WBC: CPT

## 2020-08-13 PROCEDURE — 25000003 PHARM REV CODE 250: Performed by: EMERGENCY MEDICINE

## 2020-08-13 PROCEDURE — U0002 COVID-19 LAB TEST NON-CDC: HCPCS

## 2020-08-13 PROCEDURE — 84443 ASSAY THYROID STIM HORMONE: CPT

## 2020-08-13 PROCEDURE — 94640 AIRWAY INHALATION TREATMENT: CPT

## 2020-08-13 PROCEDURE — 96365 THER/PROPH/DIAG IV INF INIT: CPT

## 2020-08-13 PROCEDURE — 96361 HYDRATE IV INFUSION ADD-ON: CPT

## 2020-08-13 PROCEDURE — 83930 ASSAY OF BLOOD OSMOLALITY: CPT

## 2020-08-13 PROCEDURE — 99999 PR PBB SHADOW E&M-EST. PATIENT-LVL II: ICD-10-PCS | Mod: PBBFAC,,, | Performed by: INTERNAL MEDICINE

## 2020-08-13 PROCEDURE — 83735 ASSAY OF MAGNESIUM: CPT

## 2020-08-13 PROCEDURE — 93010 ELECTROCARDIOGRAM REPORT: CPT | Mod: ,,, | Performed by: INTERNAL MEDICINE

## 2020-08-13 PROCEDURE — 82803 BLOOD GASES ANY COMBINATION: CPT

## 2020-08-13 PROCEDURE — 99291 CRITICAL CARE FIRST HOUR: CPT | Mod: 25

## 2020-08-13 PROCEDURE — 25000242 PHARM REV CODE 250 ALT 637 W/ HCPCS: Performed by: EMERGENCY MEDICINE

## 2020-08-13 PROCEDURE — 63600175 PHARM REV CODE 636 W HCPCS: Performed by: STUDENT IN AN ORGANIZED HEALTH CARE EDUCATION/TRAINING PROGRAM

## 2020-08-13 PROCEDURE — 51798 US URINE CAPACITY MEASURE: CPT

## 2020-08-13 PROCEDURE — 84300 ASSAY OF URINE SODIUM: CPT

## 2020-08-13 PROCEDURE — 83036 HEMOGLOBIN GLYCOSYLATED A1C: CPT

## 2020-08-13 PROCEDURE — 83605 ASSAY OF LACTIC ACID: CPT

## 2020-08-13 PROCEDURE — 99999 PR PBB SHADOW E&M-EST. PATIENT-LVL II: CPT | Mod: PBBFAC,,, | Performed by: INTERNAL MEDICINE

## 2020-08-13 PROCEDURE — 11000001 HC ACUTE MED/SURG PRIVATE ROOM

## 2020-08-13 PROCEDURE — 80171 DRUG SCREEN QUANT GABAPENTIN: CPT

## 2020-08-13 PROCEDURE — 81000 URINALYSIS NONAUTO W/SCOPE: CPT

## 2020-08-13 PROCEDURE — 99213 OFFICE O/P EST LOW 20 MIN: CPT | Mod: S$PBB,,, | Performed by: INTERNAL MEDICINE

## 2020-08-13 PROCEDURE — 93005 ELECTROCARDIOGRAM TRACING: CPT

## 2020-08-13 PROCEDURE — 84484 ASSAY OF TROPONIN QUANT: CPT

## 2020-08-13 RX ORDER — HYDROCODONE BITARTRATE AND ACETAMINOPHEN 5; 325 MG/1; MG/1
1 TABLET ORAL EVERY 6 HOURS PRN
Status: DISCONTINUED | OUTPATIENT
Start: 2020-08-13 | End: 2020-08-15 | Stop reason: HOSPADM

## 2020-08-13 RX ORDER — TALC
6 POWDER (GRAM) TOPICAL NIGHTLY PRN
Status: DISCONTINUED | OUTPATIENT
Start: 2020-08-13 | End: 2020-08-15 | Stop reason: HOSPADM

## 2020-08-13 RX ORDER — SODIUM CHLORIDE 0.9 % (FLUSH) 0.9 %
10 SYRINGE (ML) INJECTION
Status: DISCONTINUED | OUTPATIENT
Start: 2020-08-13 | End: 2020-08-15 | Stop reason: HOSPADM

## 2020-08-13 RX ORDER — INDOMETHACIN 25 MG/1
50 CAPSULE ORAL
Status: COMPLETED | OUTPATIENT
Start: 2020-08-13 | End: 2020-08-13

## 2020-08-13 RX ORDER — INSULIN ASPART 100 [IU]/ML
1-10 INJECTION, SOLUTION INTRAVENOUS; SUBCUTANEOUS
Status: DISCONTINUED | OUTPATIENT
Start: 2020-08-13 | End: 2020-08-15 | Stop reason: HOSPADM

## 2020-08-13 RX ORDER — IBUPROFEN 200 MG
16 TABLET ORAL
Status: DISCONTINUED | OUTPATIENT
Start: 2020-08-13 | End: 2020-08-15 | Stop reason: HOSPADM

## 2020-08-13 RX ORDER — HEPARIN SODIUM 5000 [USP'U]/ML
5000 INJECTION, SOLUTION INTRAVENOUS; SUBCUTANEOUS EVERY 8 HOURS
Status: DISCONTINUED | OUTPATIENT
Start: 2020-08-13 | End: 2020-08-15 | Stop reason: HOSPADM

## 2020-08-13 RX ORDER — IBUPROFEN 200 MG
24 TABLET ORAL
Status: DISCONTINUED | OUTPATIENT
Start: 2020-08-13 | End: 2020-08-15 | Stop reason: HOSPADM

## 2020-08-13 RX ORDER — SODIUM CHLORIDE, SODIUM LACTATE, POTASSIUM CHLORIDE, CALCIUM CHLORIDE 600; 310; 30; 20 MG/100ML; MG/100ML; MG/100ML; MG/100ML
INJECTION, SOLUTION INTRAVENOUS CONTINUOUS
Status: DISCONTINUED | OUTPATIENT
Start: 2020-08-13 | End: 2020-08-15

## 2020-08-13 RX ORDER — GLUCAGON 1 MG
1 KIT INJECTION
Status: DISCONTINUED | OUTPATIENT
Start: 2020-08-13 | End: 2020-08-15 | Stop reason: HOSPADM

## 2020-08-13 RX ORDER — ONDANSETRON 2 MG/ML
4 INJECTION INTRAMUSCULAR; INTRAVENOUS EVERY 8 HOURS PRN
Status: DISCONTINUED | OUTPATIENT
Start: 2020-08-13 | End: 2020-08-15 | Stop reason: HOSPADM

## 2020-08-13 RX ORDER — ALBUTEROL SULFATE 2.5 MG/.5ML
10 SOLUTION RESPIRATORY (INHALATION)
Status: COMPLETED | OUTPATIENT
Start: 2020-08-13 | End: 2020-08-13

## 2020-08-13 RX ORDER — SODIUM CHLORIDE, SODIUM LACTATE, POTASSIUM CHLORIDE, CALCIUM CHLORIDE 600; 310; 30; 20 MG/100ML; MG/100ML; MG/100ML; MG/100ML
INJECTION, SOLUTION INTRAVENOUS CONTINUOUS
Status: CANCELLED | OUTPATIENT
Start: 2020-08-13

## 2020-08-13 RX ADMIN — SODIUM ZIRCONIUM CYCLOSILICATE 10 G: 10 POWDER, FOR SUSPENSION ORAL at 02:08

## 2020-08-13 RX ADMIN — INSULIN ASPART 2 UNITS: 100 INJECTION, SOLUTION INTRAVENOUS; SUBCUTANEOUS at 06:08

## 2020-08-13 RX ADMIN — SODIUM CHLORIDE 1000 ML: 0.9 INJECTION, SOLUTION INTRAVENOUS at 10:08

## 2020-08-13 RX ADMIN — HEPARIN SODIUM 5000 UNITS: 5000 INJECTION INTRAVENOUS; SUBCUTANEOUS at 09:08

## 2020-08-13 RX ADMIN — SODIUM BICARBONATE 50 MEQ: 84 INJECTION, SOLUTION INTRAVENOUS at 12:08

## 2020-08-13 RX ADMIN — SODIUM CHLORIDE, SODIUM LACTATE, POTASSIUM CHLORIDE, AND CALCIUM CHLORIDE: .6; .31; .03; .02 INJECTION, SOLUTION INTRAVENOUS at 06:08

## 2020-08-13 RX ADMIN — INSULIN ASPART 1 UNITS: 100 INJECTION, SOLUTION INTRAVENOUS; SUBCUTANEOUS at 09:08

## 2020-08-13 RX ADMIN — INSULIN DETEMIR 10 UNITS: 100 INJECTION, SOLUTION SUBCUTANEOUS at 09:08

## 2020-08-13 RX ADMIN — DEXTROSE MONOHYDRATE 25 G: 500 INJECTION PARENTERAL at 12:08

## 2020-08-13 RX ADMIN — ALBUTEROL SULFATE 10 MG: 2.5 SOLUTION RESPIRATORY (INHALATION) at 11:08

## 2020-08-13 RX ADMIN — CALCIUM GLUCONATE 1 G: 98 INJECTION, SOLUTION INTRAVENOUS at 12:08

## 2020-08-13 RX ADMIN — INSULIN HUMAN 10 UNITS: 100 INJECTION, SOLUTION PARENTERAL at 12:08

## 2020-08-13 RX ADMIN — SODIUM CHLORIDE 1000 ML: 0.9 INJECTION, SOLUTION INTRAVENOUS at 12:08

## 2020-08-13 NOTE — ED NOTES
CMP redrawn at this time. No IV access at this time. Hyperkalemia meds held. MD at bedside with ultrasound to attempt IV access.

## 2020-08-13 NOTE — HPI
Patient is a 46 y/o male with a PMHx of Bronchitis, DM, HTN, HLD, chronic Pancreatitis, presents top ED with 3 days history od 1-2 episode of daily diarrhea with associated abdominal pain, generalized weakness, dizziness, light-headedness. He denies fever, chills, urinary symptoms, nausea or vomiting. In the ED patient labs with elevated Cr and potassium

## 2020-08-13 NOTE — ASSESSMENT & PLAN NOTE
Hyperkalemia  Hypotension  CRISTOFER likely due to diarrhea  K shifted in the ED  Avoid nephrotoxic meds  Renally dose meds  Kidney USS  strict input/output  Consult nephrology

## 2020-08-13 NOTE — ED NOTES
IV infiltrated in RAC. Iv removed. JAYANT Muse at bedside with ultrasound to insert IV and to redraw CMP.

## 2020-08-13 NOTE — PROGRESS NOTES
Subjective:       Patient ID: Wallace Gonzalez is a 45 y.o. male.    Chief Complaint: Pancreatitis    Patient here today to follow-up with aforementioned complaints.  He seems to have been scheduled in my clinic in error as he typically follows with Dr. Lyn and [facility] for the management of his chronic pancreatitis and pancreatic cysts.  On most recent MRI in November patient is likely IPMN was relatively stable.  Dr. Lyn recommended 3-6 months follow-up in his clinic, putting him about this time.  More urgent however patient was found to be hypotensive on screening vitals today in clinic.  He does report recently experiencing significant nausea and vomiting, however denies abdominal pain.  He does report some lightheadedness associated with his hypotension.  He it does seem to be slowly improving however.    Review of Systems   Constitutional: Negative for chills and fever.   HENT: Negative for congestion and trouble swallowing.    Respiratory: Negative for cough and shortness of breath.    Cardiovascular: Negative for chest pain and palpitations.   Gastrointestinal: Positive for nausea and vomiting. Negative for abdominal distention and abdominal pain.   Neurological: Positive for dizziness, weakness and numbness.       The following portions of the patient's history were reviewed and updated as appropriate: allergies, current medications, past family history, past medical history, past social history, past surgical history and problem list.    Objective:      Physical Exam  Vitals signs and nursing note reviewed.   Constitutional:       Appearance: He is well-developed.   HENT:      Head: Normocephalic and atraumatic.   Eyes:      General: No scleral icterus.     Pupils: Pupils are equal, round, and reactive to light.   Cardiovascular:      Rate and Rhythm: Normal rate and regular rhythm.      Heart sounds: Normal heart sounds.   Pulmonary:      Effort: Pulmonary effort is normal. No respiratory distress.       Breath sounds: Normal breath sounds.   Abdominal:      General: Bowel sounds are normal. There is no distension.      Palpations: Abdomen is soft.      Tenderness: There is no abdominal tenderness.   Musculoskeletal: Normal range of motion.   Neurological:      Mental Status: He is alert and oriented to person, place, and time.      Comments: No asterixis   Psychiatric:         Behavior: Behavior normal.           Pertinent labs and imaging studies reviewed    Assessment:       1. Non-intractable vomiting with nausea, unspecified vomiting type    2. Chronic pancreatitis, unspecified pancreatitis type    3. Pancreas cyst        Plan:       Given hypotension with associated nausea vomiting will send patient down to the ER  Following discharge patient is to reestablish care in the [facility]    15 minutes were spent in coordination of patient's care, record review and counseling.  More than 50% of the time was face-to-face.    (Portions of this note were dictated using voice recognition software and may contain dictation related errors in spelling/grammar/syntax not found on text review)

## 2020-08-13 NOTE — ASSESSMENT & PLAN NOTE
Hyperkalemia  Hypotension  CRISTOFER likely due to diarrhea  K shifted in the ED  Avoid nephrotoxic meds  Renally dose meds  Kidney USS  strict input/output  Consult nephrology- appreciates rec's

## 2020-08-13 NOTE — CONSULTS
Encounter Date: 2/23/2020       History     Chief Complaint   Patient presents with    Back Pain     The patient was in his usual state of health until a few hours ago, at which time he got up to urinate, and then returned to a rocking chair. Immediately, he felt a severe crampy pain in his right flank which radiated down to his right groin area. He has no history of back pain and there is no history of trauma. He has no history or kidney stones, and he has never had this type of pain before. His wife satates that he started a new job as a  and rarely gets to drink much water or urinate.        Review of patient's allergies indicates:   Allergen Reactions    Penicillins Other (See Comments)     Jerry marsha syndrome     History reviewed. No pertinent past medical history.  Past Surgical History:   Procedure Laterality Date    FINGER SURGERY Left      Family History   Problem Relation Age of Onset    Hypertension Mother     Hypertension Father      Social History     Tobacco Use    Smoking status: Never Smoker   Substance Use Topics    Alcohol use: No    Drug use: No     Review of Systems   All other systems reviewed and are negative.      Physical Exam     Initial Vitals [02/23/20 1516]   BP Pulse Resp Temp SpO2   (!) 198/92 64 16 96.1 °F (35.6 °C) 100 %      MAP       --         Physical Exam    Nursing note and vitals reviewed.  Constitutional: He appears well-developed and well-nourished.   HENT:   Head: Normocephalic.   Nose: Nose normal.   Mouth/Throat: Oropharynx is clear and moist.   Eyes: Conjunctivae and EOM are normal. Pupils are equal, round, and reactive to light.   Neck: Normal range of motion. Neck supple.   Cardiovascular: Normal rate, regular rhythm, normal heart sounds and intact distal pulses. Exam reveals no gallop and no friction rub.    No murmur heard.  Pulmonary/Chest: Breath sounds normal.   Right CVA tenderness on percussion   Abdominal: Soft. Bowel sounds are normal.  Nephrology Consult Note    ?  ?  CONSULTING PHYSICIAN:  Attending Physician: Donya Chen MD  Reason for Consult:   Nephrology consulted for CRISTOFER management.  CHIEF COMPLAINT :Hypotension (pt has been feeling weak and has had poor vision for the past few days. also has been vomiting after eating anything for the past 4 days. was hypotensive in Dr. Tena's office pta, so was sent to the ER.)    Subjective:     History of Present Illness:  Wallace Gonzalez is a 45 y.o.   male who  has a past medical history of Bronchitis, Diabetic peripheral neuropathy, DM2 (diabetes mellitus, type 2), HTN (hypertension), Hypercholesterolemia, Pancreatic duct stones, Pancreatic lesion, Pancreatitis, and Type 2 diabetes mellitus (8/13/2020).. The patient presented to the ED [place] on 8/13/2020 with a primary complaint of weakness hypotension from Dr Tena's clinic. In ER found to have sever renal failure, with multiple electrolytes derangement, acidosis. Patient reports taking 1000 Metformin TID, 800 Gabapentin TID, Lisinopril, Denies NSAIDs use, admits Cocaine use last use 5-6 month ago. Denies chest pain, SOB, no Hx of CAD. Denies EtOH use.     ?  ?  ?  ?  Past Medical History:  Past Medical History:   Diagnosis Date    Bronchitis     Diabetic peripheral neuropathy     DM2 (diabetes mellitus, type 2)     HTN (hypertension)     Hypercholesterolemia     Pancreatic duct stones     Pancreatic lesion     Pancreatitis     Type 2 diabetes mellitus 8/13/2020     Past Surgical History:  Past Surgical History:   Procedure Laterality Date    ENDOSCOPIC ULTRASOUND OF UPPER GASTROINTESTINAL TRACT N/A 2/7/2019    Procedure: ULTRASOUND, UPPER GI TRACT, ENDOSCOPIC;  Surgeon: Jv Thompson MD;  Location: [address];  Service: Endoscopy;  Laterality: N/A;    ENDOSCOPIC ULTRASOUND OF UPPER GASTROINTESTINAL TRACT N/A 4/5/2019    Procedure: ULTRASOUND, UPPER GI TRACT, ENDOSCOPIC;  Surgeon: Charles Wadsworth MD;  Location:    Right inguinal tenderness   Musculoskeletal: Normal range of motion.   Neurological: He is alert and oriented to person, place, and time. He has normal strength.   Skin: Skin is warm and dry. Capillary refill takes less than 2 seconds.         ED Course   Procedures  Labs Reviewed   URINALYSIS, REFLEX TO URINE CULTURE   CBC W/ AUTO DIFFERENTIAL          Imaging Results    None                                          Clinical Impression:       ICD-10-CM ICD-9-CM   1. Renal colic on right side N23 788.0         Disposition:   Disposition: Discharged  Condition: Stable                     Mandie Rivera MD  02/23/20 5035     [facility] ([facility]  Service: Endoscopy;  Laterality: N/A;  PM prep    ENDOSCOPIC ULTRASOUND OF UPPER GASTROINTESTINAL TRACT N/A 6/13/2019    Procedure: ULTRASOUND, UPPER GI TRACT, ENDOSCOPIC;  Surgeon: Aureliano Lyn MD;  [facility]: [facility]);  Service: Endoscopy;  Laterality: N/A;    ENDOSCOPIC ULTRASOUND OF UPPER GASTROINTESTINAL TRACT N/A 8/29/2019    Procedure: ULTRASOUND, UPPER GI TRACT, ENDOSCOPIC;  Surgeon: Aureliano Lyn MD;  Location: [facility] ([address] [place] [address]);  Service: Endoscopy;  Laterality: N/A;  [facility]     Allergies:  Review of patient's allergies indicates:  No Known Allergies  ?  Medications:  Home Medications:  No current facility-administered medications for this encounter.     Current Outpatient Medications:     albuterol 90 mcg/actuation inhaler, Inhale 2 puffs into the lungs every 6 (six) hours as needed for Wheezing., Disp: , Rfl:     amitriptyline (ELAVIL) 50 MG tablet, Take 50 mg by mouth every evening., Disp: , Rfl:     cloNIDine (CATAPRES) 0.1 MG tablet, , Disp: , Rfl: 5    gabapentin (NEURONTIN) 600 MG tablet, Take 600 mg by mouth 3 (three) times daily., Disp: , Rfl:     HYDROcodone-acetaminophen (NORCO)  mg per tablet, , Disp: , Rfl: 0    insulin glargine,hum.rec.anlog (BASAGLAR KWIKPEN U-100 INSULIN SUBQ), Inject 20 Units into the skin every evening., Disp: , Rfl:     lipase-protease-amylase 12,000-38,000-60,000 units (CREON) CpDR, Take by mouth., Disp: , Rfl:     lisinopril (PRINIVIL,ZESTRIL) 5 MG tablet, Take 5 mg by mouth once daily., Disp: , Rfl:     meloxicam (MOBIC) 15 MG tablet, , Disp: , Rfl: 2    metFORMIN (GLUCOPHAGE) 1000 MG tablet, Take 1,000 mg by mouth daily with breakfast., Disp: , Rfl:     metoprolol succinate (TOPROL-XL) 50 MG 24 hr tablet, , Disp: , Rfl: 5    omeprazole (PRILOSEC) 20 MG capsule, Take 1 capsule (20 mg total) by mouth once daily. (Patient not taking: Reported on 8/13/2020), Disp: 30 capsule, Rfl: 1    omeprazole (PRILOSEC)  40 MG capsule, , Disp: , Rfl: 4    ondansetron (ZOFRAN-ODT) 4 MG TbDL, Take 8 mg by mouth every 8 (eight) hours., Disp: , Rfl:     rosuvastatin calcium (ROSUVASTATIN ORAL), Take 1 tablet by mouth every evening., Disp: , Rfl:   Inpatient Meds: Scheduled Meds:  Continuous Infusions:  PRN Meds:.  Family History:  No family history on file.  Social History:  Social History     Socioeconomic History    Marital status: Single     Spouse name: Not on file    Number of children: Not on file    Years of education: Not on file    Highest education level: Not on file   Occupational History    Not on file   Social Needs    Financial resource strain: Not on file    Food insecurity     Worry: Not on file     Inability: Not on file    Transportation needs     Medical: Not on file     Non-medical: Not on file   Tobacco Use    Smoking status: Light Tobacco Smoker     Types: Cigarettes   Substance and Sexual Activity    Alcohol use: Yes     Comment: previous extensive use    Drug use: Yes     Comment: Marijuana on occasion    Sexual activity: Not on file   Lifestyle    Physical activity     Days per week: Not on file     Minutes per session: Not on file    Stress: Not on file   Relationships    Social connections     Talks on phone: Not on file     Gets together: Not on file     Attends Caodaism service: Not on file     Active member of club or organization: Not on file     Attends meetings of clubs or organizations: Not on file     Relationship status: Not on file   Other Topics Concern    Not on file   Social History Narrative    Not on file     Review of Systems:  Review of Systems   Constitutional: Negative for chills and fever.   HENT: Negative for congestion and sore throat.    Eyes: Negative for blurred vision, double vision and photophobia.   Respiratory: Negative for cough and shortness of breath.    Cardiovascular: Negative for chest pain, palpitations and leg swelling.   Gastrointestinal: Negative for  abdominal pain, diarrhea, nausea and vomiting.   Genitourinary: Negative for dysuria and urgency.   Musculoskeletal: Negative for joint pain and myalgias.   Skin: Negative for itching and rash.   Neurological: Positive for dizziness and weakness. Negative for sensory change, loss of consciousness and headaches.   Endo/Heme/Allergies: Negative for polydipsia. Does not bruise/bleed easily.   Psychiatric/Behavioral: Negative for depression.     ?    Objective:     Vitals:    08/13/20 1432   BP: 111/61   Pulse: 79   Resp: 12   Temp:        PHYSICALEXAMINATION  Physical Exam  Vitals signs and nursing note reviewed.   Constitutional:       General: He is not in acute distress.     Appearance: He is well-developed. He is not diaphoretic.   HENT:      Head: Normocephalic and atraumatic.      Mouth/Throat:      Mouth: Mucous membranes are dry.      Pharynx: No oropharyngeal exudate.   Eyes:      General: No scleral icterus.     Pupils: Pupils are equal, round, and reactive to light.   Neck:      Musculoskeletal: Normal range of motion and neck supple.      Trachea: No tracheal deviation.   Cardiovascular:      Rate and Rhythm: Regular rhythm.      Heart sounds: Normal heart sounds. No murmur.   Pulmonary:      Effort: Pulmonary effort is normal.      Breath sounds: Normal breath sounds.   Abdominal:      General: Bowel sounds are normal. There is no distension.      Palpations: Abdomen is soft.      Tenderness: There is abdominal tenderness in the suprapubic area.   Musculoskeletal: Normal range of motion.         General: No deformity.   Skin:     General: Skin is warm and dry.      Findings: No erythema or rash.   Neurological:      Mental Status: He is alert and oriented to person, place, and time.      Cranial Nerves: No cranial nerve deficit.   Psychiatric:         Behavior: Behavior normal.         Thought Content: Thought content normal.       Laboratory Results:  Most Recent Data:  CBC:   Lab Results   Component  Value Date    WBC 9.07 08/13/2020    HGB 11.5 (L) 08/13/2020    HCT 34.3 (L) 08/13/2020     08/13/2020    MCV 85 08/13/2020    RDW 14.6 (H) 08/13/2020     BMP:   Lab Results   Component Value Date     (L) 08/13/2020    K 7.2 (HH) 08/13/2020    CL 94 (L) 08/13/2020    CO2 13 (L) 08/13/2020    BUN 87 (H) 08/13/2020     (H) 08/13/2020    CALCIUM 8.2 (L) 08/13/2020    MG 1.6 08/13/2020    PHOS 3.0 12/17/2014     LFTs:   Lab Results   Component Value Date    PROT 8.6 (H) 08/13/2020    ALBUMIN 4.2 08/13/2020    BILITOT 0.3 08/13/2020    AST 18 08/13/2020    ALKPHOS 112 08/13/2020    ALT 19 08/13/2020     Coags: No results found for: INR, PROTIME, PTT  FLP:   Lab Results   Component Value Date    CHOL 194 12/12/2014    HDL 49 12/12/2014    LDLCALC 130.6 12/12/2014    TRIG 72 12/12/2014    CHOLHDL 25.3 12/12/2014     DM:   Lab Results   Component Value Date    LDLCALC 130.6 12/12/2014    CREATININE 13.2 (H) 08/13/2020     Thyroid:   Lab Results   Component Value Date    TSH 1.984 08/13/2020     Anemia: No results found for: IRON, TIBC, FERRITIN, [id number], FOLATE  ?  ?  ASSESSMENT / PLAN:  Patient is a 45 y.o. male with PMH as mentioned above here for CRISTOFER   CRISTOFER - Not clear etiology at this point  Multifactorial. Unknown baseline last labs 2014 cr 0.8   -- DDx include  ATN, Sepsis,  AIN, also possible obstruction and metformin toxicity.  -- Making Urine   -- Will get work up   -      less likely cause of CRISTOFER   -     Hemoglobin A1c; Standing  -     Urinalysis, Reflex to Urine Culture Urine, Clean Catch; Standing  -     US Doppler Abd/Retroperitoneal Limited; Standing  -     Osmolality, Serum; Standing  -     Osmolality, urine; Standing  -     Sodium, urine, random; Standing  -     Creatinine, urine, random; Standing  -     Lactic acid, plasma; Standing  -     Gabapentin level; Standing  -- Flow up repeat labs after IVF   -- For now place mendoza   -- Please avoid nephrotoxins, including NSAIDs,  aminoglycosides, IV contrast (unless absolutely necessary), gadolinium, fleets and other phosphorous-based laxatives. Caution with antibiotics.  -- Low threshold for RRT  High K+ with ECG changes will f/u repeat K+   -- Daily Renal Function Panel  -- Avoid Hypotension.  -- Renally dose all meds    Hyponatremia   -- Na+ 125 no visual changes no HA, weakness ? Due to meds hypotension   -- check Serum Osm   -- Seems volume depleted s/p 2L of NS   -- Goal no more 6-8 mEq correction in 24hr  -- Labs Q4     Hyperkalemia   -- 7.2 ECG changes   -- S/P medical management per ED protocol   -- Follow up repeat     Acidosis AGMA - renal failure + metformin   -- will get VBG, LA , S Osm   -- Denies any Ingestions     Thank you for consult, will follow  With any question please call [phone]  Richard Mcnamara MD    [facility]  ADOLFO Estrada MD, FACMARK PIERRE MD,   MD TARIK Yoo MD E. V. Harmon, NP    [address]  [place], [address] [phone]

## 2020-08-13 NOTE — SUBJECTIVE & OBJECTIVE
Past Medical History:   Diagnosis Date    Bronchitis     Diabetic peripheral neuropathy     DM2 (diabetes mellitus, type 2)     HTN (hypertension)     Hypercholesterolemia     Pancreatic duct stones     Pancreatic lesion     Pancreatitis        Past Surgical History:   Procedure Laterality Date    ENDOSCOPIC ULTRASOUND OF UPPER GASTROINTESTINAL TRACT N/A 2/7/2019    Procedure: ULTRASOUND, UPPER GI TRACT, ENDOSCOPIC;  Surgeon: Jv Thompson MD;  Location: [address]);  Service: Endoscopy;  Laterality: N/A;    ENDOSCOPIC ULTRASOUND OF UPPER GASTROINTESTINAL TRACT N/A 4/5/2019    Procedure: ULTRASOUND, UPPER GI TRACT, ENDOSCOPIC;  Surgeon: Charles Wadsworth MD;  Location: [address]);  Service: Endoscopy;  Laterality: N/A;  PM prep    ENDOSCOPIC ULTRASOUND OF UPPER GASTROINTESTINAL TRACT N/A 6/13/2019    Procedure: ULTRASOUND, UPPER GI TRACT, ENDOSCOPIC;  Surgeon: Aureliano Lyn MD;  Location: [address]);  Service: Endoscopy;  Laterality: N/A;    ENDOSCOPIC ULTRASOUND OF UPPER GASTROINTESTINAL TRACT N/A 8/29/2019    Procedure: ULTRASOUND, UPPER GI TRACT, ENDOSCOPIC;  Surgeon: Aureliano Lyn MD;  Location: [address]);  Service: Endoscopy;  Laterality: N/A;  [facility]       Review of patient's allergies indicates:  No Known Allergies    No current facility-administered medications on file prior to encounter.      Current Outpatient Medications on File Prior to Encounter   Medication Sig    albuterol 90 mcg/actuation inhaler Inhale 2 puffs into the lungs every 6 (six) hours as needed for Wheezing.    amitriptyline (ELAVIL) 50 MG tablet Take 50 mg by mouth every evening.    cloNIDine (CATAPRES) 0.1 MG tablet     gabapentin (NEURONTIN) 600 MG tablet Take 600 mg by mouth 3 (three) times daily.    HYDROcodone-acetaminophen (NORCO)  mg per tablet     insulin glargine,hum.rec.anlog (BASAGLAR KWIKPEN U-100 INSULIN SUBQ) Inject 20 Units into the skin every evening.     lipase-protease-amylase 12,000-38,000-60,000 units (CREON) CpDR Take by mouth.    lisinopril (PRINIVIL,ZESTRIL) 5 MG tablet Take 5 mg by mouth once daily.    meloxicam (MOBIC) 15 MG tablet     metFORMIN (GLUCOPHAGE) 1000 MG tablet Take 1,000 mg by mouth daily with breakfast.    metoprolol succinate (TOPROL-XL) 50 MG 24 hr tablet     omeprazole (PRILOSEC) 20 MG capsule Take 1 capsule (20 mg total) by mouth once daily. (Patient not taking: Reported on 8/13/2020)    omeprazole (PRILOSEC) 40 MG capsule     ondansetron (ZOFRAN-ODT) 4 MG TbDL Take 8 mg by mouth every 8 (eight) hours.    rosuvastatin calcium (ROSUVASTATIN ORAL) Take 1 tablet by mouth every evening.     Family History     None        Tobacco Use    Smoking status: Light Tobacco Smoker     Types: Cigarettes   Substance and Sexual Activity    Alcohol use: Yes     Comment: previous extensive use    Drug use: Yes     Comment: Marijuana on occasion    Sexual activity: Not on file     Review of Systems   Constitutional: Negative for chills and fever.   HENT: Negative for congestion and rhinorrhea.    Eyes: Negative for photophobia and discharge.   Respiratory: Negative for chest tightness and shortness of breath.    Cardiovascular: Negative for chest pain and palpitations.   Gastrointestinal: Positive for abdominal pain and diarrhea. Negative for abdominal distention, blood in stool, nausea and vomiting.   Endocrine: Negative for polydipsia and polyphagia.   Genitourinary: Negative for decreased urine volume, dysuria, flank pain and hematuria.   Musculoskeletal: Negative for arthralgias and myalgias.   Skin: Negative for color change and rash.   Neurological: Negative for dizziness, weakness and headaches.   Hematological: Does not bruise/bleed easily.   Psychiatric/Behavioral: Negative for agitation, behavioral problems and decreased concentration.     Objective:     Vital Signs (Most Recent):  Temp: 97.7 °F (36.5 °C) (08/13/20 1011)  Pulse: 84  (08/13/20 1332)  Resp: 20 (08/13/20 1332)  BP: 113/60 (08/13/20 1332)  SpO2: 100 % (08/13/20 1332) Vital Signs (24h Range):  Temp:  [97.7 °F (36.5 °C)] 97.7 °F (36.5 °C)  Pulse:  [80-90] 84  Resp:  [13-20] 20  SpO2:  [95 %-100 %] 100 %  BP: ()/(53-61) 113/60     Weight: 99.8 kg (220 lb)  Body mass index is 27.87 kg/m².    Physical Exam  Constitutional:       Appearance: He is well-developed.   HENT:      Head: Normocephalic and atraumatic.      Nose: Nose normal.      Mouth/Throat:      Mouth: Mucous membranes are moist.   Eyes:      Pupils: Pupils are equal, round, and reactive to light.   Neck:      Musculoskeletal: Normal range of motion and neck supple.   Cardiovascular:      Rate and Rhythm: Normal rate and regular rhythm.      Heart sounds: Normal heart sounds. No murmur. No friction rub. No gallop.    Pulmonary:      Effort: Pulmonary effort is normal.      Breath sounds: Normal breath sounds.   Chest:      Chest wall: No tenderness.   Abdominal:      General: Bowel sounds are normal. There is no distension.      Palpations: Abdomen is soft.      Tenderness: There is no abdominal tenderness.   Musculoskeletal: Normal range of motion.   Skin:     General: Skin is warm.      Capillary Refill: Capillary refill takes less than 2 seconds.   Neurological:      Mental Status: He is alert and oriented to person, place, and time.   Psychiatric:         Mood and Affect: Mood normal.           CRANIAL NERVES     CN III, IV, VI   Pupils are equal, round, and reactive to light.       Significant Labs:   A1C: No results for input(s): HGBA1C in the last 4320 hours.  Blood Culture: No results for input(s): LABBLOO in the last 48 hours.  CBC:   Recent Labs   Lab 08/13/20  1047   WBC 9.07   HGB 11.5*   HCT 34.3*        CMP:   Recent Labs   Lab 08/13/20  1047 08/13/20  1208   * 125*   K 6.8* 7.2*   CL 91* 94*   CO2 17* 13*   * 179*   BUN 87* 87*   CREATININE 14.2* 13.2*   CALCIUM 8.5* 8.2*   PROT 8.9*  8.6*   ALBUMIN 4.3 4.2   BILITOT 0.4 0.3   ALKPHOS 113 112   AST 18 18   ALT 19 19   ANIONGAP 19* 18*   EGFRNONAA 4* 4*     Lactic Acid: No results for input(s): LACTATE in the last 48 hours.  Lipase:   Recent Labs   Lab 08/13/20  1047   LIPASE 39     Lipid Panel: No results for input(s): CHOL, HDL, LDLCALC, TRIG, CHOLHDL in the last 48 hours.  Magnesium:   Recent Labs   Lab 08/13/20  1047   MG 1.6     Respiratory Culture: No results for input(s): GSRESP, RESPIRATORYC in the last 48 hours.  Troponin:   Recent Labs   Lab 08/13/20  1047   TROPONINI <0.006     Urine Culture: No results for input(s): LABURIN in the last 48 hours.  Urine Studies: No results for input(s): COLORU, APPEARANCEUA, PHUR, SPECGRAV, PROTEINUA, GLUCUA, KETONESU, BILIRUBINUA, OCCULTUA, NITRITE, UROBILINOGEN, LEUKOCYTESUR, RBCUA, WBCUA, BACTERIA, SQUAMEPITHEL, HYALINECASTS in the last 48 hours.    Invalid input(s): WRIGHTSUR      Imaging Results          X-Ray Chest PA And Lateral (Final result)  Result time 08/13/20 11:21:45    Final result by Regi Davis MD (08/13/20 11:21:45)                 Impression:      No convincing evidence of disease.      Electronically signed by: Regi Davis MD  Date:    08/13/2020  Time:    11:21             Narrative:    EXAMINATION:  XR CHEST PA AND LATERAL    CLINICAL HISTORY:  Weakness    TECHNIQUE:  PA and lateral views of the chest were performed.    COMPARISON:  None    FINDINGS:  Soft tissues of the patient's arms project over lateral view obscuring some detail of the retrosternal airspace and mediastinal margins.    Mediastinal structures are midline. Cardiac silhouette and pulmonary vascular distribution are normal.    Lung volumes are normal and symmetric. I detect no pulmonary disease, pleural fluid, lymph node enlargement, cardiac decompensation, pneumothorax, pneumomediastinum, pneumoperitoneum or significant osseous abnormality.                                Significant Imaging: I have  reviewed all pertinent imaging results/findings within the past 24 hours.

## 2020-08-13 NOTE — H&P
[facility] Medicine  History & Physical    Patient Name: Wallace Gonzalez  MRN: [id number]  Admission Date: 8/13/2020  Attending Physician: Annabel Wright*   Primary Care Provider: Roberto Carlos Nava Jr, MD         Patient information was obtained from patient and ER records.     Subjective:     Principal Problem:Acute renal failure    Chief Complaint:   Chief Complaint   Patient presents with    Hypotension     pt has been feeling weak and has had poor vision for the past few days. also has been vomiting after eating anything for the past 4 days. was hypotensive in Dr. Tena's office pta, so was sent to the ER.        HPI: Patient is a 46 y/o male with a PMHx of Bronchitis, DM, HTN, HLD, chronic Pancreatitis, presents top ED with 3 days history od 1-2 episode of daily diarrhea with associated abdominal pain, generalized weakness, dizziness, light-headedness. He denies fever, chills, urinary symptoms, nausea or vomiting. In the ED patient labs with elevated Cr and potassium    Past Medical History:   Diagnosis Date    Bronchitis     Diabetic peripheral neuropathy     DM2 (diabetes mellitus, type 2)     HTN (hypertension)     Hypercholesterolemia     Pancreatic duct stones     Pancreatic lesion     Pancreatitis        Past Surgical History:   Procedure Laterality Date    ENDOSCOPIC ULTRASOUND OF UPPER GASTROINTESTINAL TRACT N/A 2/7/2019    Procedure: ULTRASOUND, UPPER GI TRACT, ENDOSCOPIC;  Surgeon: Jv Thompson MD;  Location: [address];  Service: Endoscopy;  Laterality: N/A;    ENDOSCOPIC ULTRASOUND OF UPPER GASTROINTESTINAL TRACT N/A 4/5/2019    Procedure: ULTRASOUND, UPPER GI TRACT, ENDOSCOPIC;  Surgeon: Charles Wadsworth MD;  Location: [address]);  Service: Endoscopy;  Laterality: N/A;  PM prep    ENDOSCOPIC ULTRASOUND OF UPPER GASTROINTESTINAL TRACT N/A 6/13/2019    Procedure: ULTRASOUND, UPPER GI TRACT, ENDOSCOPIC;  Surgeon: Aureliano Lyn,  MD;  [facility] ([address] [place] [address]);  Service: Endoscopy;  Laterality: N/A;    ENDOSCOPIC ULTRASOUND OF UPPER GASTROINTESTINAL TRACT N/A 8/29/2019    Procedure: ULTRASOUND, UPPER GI TRACT, ENDOSCOPIC;  Surgeon: Aureliano Lyn MD;  Location: [facility] ([address] [place] [address]);  Service: Endoscopy;  Laterality: N/A;  [place]       Review of patient's allergies indicates:  No Known Allergies    No current facility-administered medications on file prior to encounter.      Current Outpatient Medications on File Prior to Encounter   Medication Sig    albuterol 90 mcg/actuation inhaler Inhale 2 puffs into the lungs every 6 (six) hours as needed for Wheezing.    amitriptyline (ELAVIL) 50 MG tablet Take 50 mg by mouth every evening.    cloNIDine (CATAPRES) 0.1 MG tablet     gabapentin (NEURONTIN) 600 MG tablet Take 600 mg by mouth 3 (three) times daily.    HYDROcodone-acetaminophen (NORCO)  mg per tablet     insulin glargine,hum.rec.anlog (BASAGLAR KWIKPEN U-100 INSULIN SUBQ) Inject 20 Units into the skin every evening.    lipase-protease-amylase 12,000-38,000-60,000 units (CREON) CpDR Take by mouth.    lisinopril (PRINIVIL,ZESTRIL) 5 MG tablet Take 5 mg by mouth once daily.    meloxicam (MOBIC) 15 MG tablet     metFORMIN (GLUCOPHAGE) 1000 MG tablet Take 1,000 mg by mouth daily with breakfast.    metoprolol succinate (TOPROL-XL) 50 MG 24 hr tablet     omeprazole (PRILOSEC) 20 MG capsule Take 1 capsule (20 mg total) by mouth once daily. (Patient not taking: Reported on 8/13/2020)    omeprazole (PRILOSEC) 40 MG capsule     ondansetron (ZOFRAN-ODT) 4 MG TbDL Take 8 mg by mouth every 8 (eight) hours.    rosuvastatin calcium (ROSUVASTATIN ORAL) Take 1 tablet by mouth every evening.     Family History     None        Tobacco Use    Smoking status: Light Tobacco Smoker     Types: Cigarettes   Substance and Sexual Activity    Alcohol use: Yes     Comment: previous extensive use    Drug use: Yes     Comment: Marijuana on  occasion    Sexual activity: Not on file     Review of Systems   Constitutional: Negative for chills and fever.   HENT: Negative for congestion and rhinorrhea.    Eyes: Negative for photophobia and discharge.   Respiratory: Negative for chest tightness and shortness of breath.    Cardiovascular: Negative for chest pain and palpitations.   Gastrointestinal: Positive for abdominal pain and diarrhea. Negative for abdominal distention, blood in stool, nausea and vomiting.   Endocrine: Negative for polydipsia and polyphagia.   Genitourinary: Negative for decreased urine volume, dysuria, flank pain and hematuria.   Musculoskeletal: Negative for arthralgias and myalgias.   Skin: Negative for color change and rash.   Neurological: Negative for dizziness, weakness and headaches.   Hematological: Does not bruise/bleed easily.   Psychiatric/Behavioral: Negative for agitation, behavioral problems and decreased concentration.     Objective:     Vital Signs (Most Recent):  Temp: 97.7 °F (36.5 °C) (08/13/20 1011)  Pulse: 84 (08/13/20 1332)  Resp: 20 (08/13/20 1332)  BP: 113/60 (08/13/20 1332)  SpO2: 100 % (08/13/20 1332) Vital Signs (24h Range):  Temp:  [97.7 °F (36.5 °C)] 97.7 °F (36.5 °C)  Pulse:  [80-90] 84  Resp:  [13-20] 20  SpO2:  [95 %-100 %] 100 %  BP: ()/(53-61) 113/60     Weight: 99.8 kg (220 lb)  Body mass index is 27.87 kg/m².    Physical Exam  Constitutional:       Appearance: He is well-developed.   HENT:      Head: Normocephalic and atraumatic.      Nose: Nose normal.      Mouth/Throat:      Mouth: Mucous membranes are moist.   Eyes:      Pupils: Pupils are equal, round, and reactive to light.   Neck:      Musculoskeletal: Normal range of motion and neck supple.   Cardiovascular:      Rate and Rhythm: Normal rate and regular rhythm.      Heart sounds: Normal heart sounds. No murmur. No friction rub. No gallop.    Pulmonary:      Effort: Pulmonary effort is normal.      Breath sounds: Normal breath sounds.    Chest:      Chest wall: No tenderness.   Abdominal:      General: Bowel sounds are normal. There is no distension.      Palpations: Abdomen is soft.      Tenderness: There is no abdominal tenderness.   Musculoskeletal: Normal range of motion.   Skin:     General: Skin is warm.      Capillary Refill: Capillary refill takes less than 2 seconds.   Neurological:      Mental Status: He is alert and oriented to person, place, and time.   Psychiatric:         Mood and Affect: Mood normal.           CRANIAL NERVES     CN III, IV, VI   Pupils are equal, round, and reactive to light.       Significant Labs:   A1C: No results for input(s): HGBA1C in the last 4320 hours.  Blood Culture: No results for input(s): LABBLOO in the last 48 hours.  CBC:   Recent Labs   Lab 08/13/20  1047   WBC 9.07   HGB 11.5*   HCT 34.3*        CMP:   Recent Labs   Lab 08/13/20  1047 08/13/20  1208   * 125*   K 6.8* 7.2*   CL 91* 94*   CO2 17* 13*   * 179*   BUN 87* 87*   CREATININE 14.2* 13.2*   CALCIUM 8.5* 8.2*   PROT 8.9* 8.6*   ALBUMIN 4.3 4.2   BILITOT 0.4 0.3   ALKPHOS 113 112   AST 18 18   ALT 19 19   ANIONGAP 19* 18*   EGFRNONAA 4* 4*     Lactic Acid: No results for input(s): LACTATE in the last 48 hours.  Lipase:   Recent Labs   Lab 08/13/20  1047   LIPASE 39     Lipid Panel: No results for input(s): CHOL, HDL, LDLCALC, TRIG, CHOLHDL in the last 48 hours.  Magnesium:   Recent Labs   Lab 08/13/20  1047   MG 1.6     Respiratory Culture: No results for input(s): GSRESP, RESPIRATORYC in the last 48 hours.  Troponin:   Recent Labs   Lab 08/13/20  1047   TROPONINI <0.006     Urine Culture: No results for input(s): LABURIN in the last 48 hours.  Urine Studies: No results for input(s): COLORU, APPEARANCEUA, PHUR, SPECGRAV, PROTEINUA, GLUCUA, KETONESU, BILIRUBINUA, OCCULTUA, NITRITE, UROBILINOGEN, LEUKOCYTESUR, RBCUA, WBCUA, BACTERIA, SQUAMEPITHEL, HYALINECASTS in the last 48 hours.    Invalid input(s): FPW [employer]      Imaging  Results          X-Ray Chest PA And Lateral (Final result)  Result time 08/13/20 11:21:45    Final result by Regi Davis MD (08/13/20 11:21:45)                 Impression:      No convincing evidence of disease.      Electronically signed by: Regi Davis MD  Date:    08/13/2020  Time:    11:21             Narrative:    EXAMINATION:  XR CHEST PA AND LATERAL    CLINICAL HISTORY:  Weakness    TECHNIQUE:  PA and lateral views of the chest were performed.    COMPARISON:  None    FINDINGS:  Soft tissues of the patient's arms project over lateral view obscuring some detail of the retrosternal airspace and mediastinal margins.    Mediastinal structures are midline. Cardiac silhouette and pulmonary vascular distribution are normal.    Lung volumes are normal and symmetric. I detect no pulmonary disease, pleural fluid, lymph node enlargement, cardiac decompensation, pneumothorax, pneumomediastinum, pneumoperitoneum or significant osseous abnormality.                                Significant Imaging: I have reviewed all pertinent imaging results/findings within the past 24 hours.    Assessment/Plan:     * Acute renal failure  Hyperkalemia  Hypotension  CRISTOFER likely due to diarrhea  K shifted in the ED  Avoid nephrotoxic meds  Renally dose meds  Kidney USS  strict input/output  Consult nephrology        Essential hypertension  Hold Lisinopril, Metoprolol, Clonidine due to hypotension      Abdominal pain  Diarrhea  Pain med prn      Type 2 diabetes mellitus  Hold Metformin  Levemir  Low dose SSI  NPO  Accucheck      Hyponatremia  IVF  Nephrology on board      Asthma  stable      Pancreatitis, acute          VTE Risk Mitigation (From admission, onward)         Ordered     heparin (porcine) injection 5,000 Units  Every 8 hours      08/13/20 1733     IP VTE LOW RISK PATIENT  Once      08/13/20 1733                   Annabel N HermelindoocentMD Nish  Department of Hospital Medicine   [facility]

## 2020-08-13 NOTE — FIRST PROVIDER EVALUATION
Emergency Department TeleTRIAGE Encounter Note      CHIEF COMPLAINT    Chief Complaint   Patient presents with    Hypotension     pt has been feeling weak and has had poor vision for the past few days. also has been vomiting after eating anything for the past 4 days. was hypotensive in Dr. Tena's office pta, so was sent to the ER.       VITAL SIGNS   Initial Vitals [08/13/20 1011]   BP Pulse Resp Temp SpO2   (!) 90/53 88 20 97.7 °F (36.5 °C) 97 %      MAP       --            ALLERGIES    Review of patient's allergies indicates:  No Known Allergies    PROVIDER TRIAGE NOTE  Patient with history of pancreatitis presents with chief complaint of generalized weakness, he has vomiting x 2-3 days (2-3 times per day).  He denies abdominal pain , diarrhea, or fever.  Patient went to a GI physician and was advised to come to the ER due to low blood pressure.  He has blurry vision, but denies headache or syncope.  Will order labs, IV Fluids, EKG, pending ED provider evaluation.        ORDERS  Labs Reviewed - No data to display    ED Orders (720h ago, onward)    None            Virtual Visit Note: The provider triage portion of this emergency department evaluation and documentation was performed via [employer], a HIPAA-compliant telemedicine application, in concert with a tele-presenter in the room. A face to face patient evaluation with one of my colleagues will occur once the patient is placed in an emergency department room.      DISCLAIMER: This note was prepared with [employer] voice recognition transcription software. Garbled syntax, mangled pronouns, and other bizarre constructions may be attributed to that software system.

## 2020-08-13 NOTE — ED PROVIDER NOTES
"Encounter Date: 8/13/2020    COVID Statement  The [place] [employer] Services and Aureliano Daniel, Governor of the [facility], have declared a State of Public Health Emergency due to the spread of a novel coronavirus and disease (COVID-19).  There is no currently accepted treatment except conservative measures and respiratory support if appropriate.  This has lead to significant resource capacity and potential delays in care.         SCRIBE #1 NOTE: I, Ceasar Pineda, am scribing for, and in the presence of, Donya Chen MD.       History     Chief Complaint   Patient presents with    Hypotension     pt has been feeling weak and has had poor vision for the past few days. also has been vomiting after eating anything for the past 4 days. was hypotensive in Dr. Tena's office pta, so was sent to the ER.     Patient is a 46 y/o male with a PMHx of Bronchitis, DM, HTN, HLD, Pancreatitis, who presents to the ED due to dizziness, light-headedness, generalized weakness that started 3 days ago. While in Dr. Tena's clinic today for follow up, patient was found to be hypotensive and sent to the ER for evaluation. Also admits to vomiting multiple times daily, but states he has been able to tolerate "some things" PO. Denies any fever, chills, diarrhea, abd pain, chest pain, SOB. He has been compliant with BP meds. No alleviating or aggravating factors    The history is provided by the patient.     Review of patient's allergies indicates:  No Known Allergies  Past Medical History:   Diagnosis Date    Bronchitis     Diabetic peripheral neuropathy     DM2 (diabetes mellitus, type 2)     HTN (hypertension)     Hypercholesterolemia     Pancreatic duct stones     Pancreatic lesion     Pancreatitis      Past Surgical History:   Procedure Laterality Date    ENDOSCOPIC ULTRASOUND OF UPPER GASTROINTESTINAL TRACT N/A 2/7/2019    Procedure: ULTRASOUND, UPPER GI TRACT, ENDOSCOPIC;  Surgeon: Jv Thompson, " MD;  Location: [facility] ([address] [place] [address]);  Service: Endoscopy;  Laterality: N/A;    ENDOSCOPIC ULTRASOUND OF UPPER GASTROINTESTINAL TRACT N/A 4/5/2019    Procedure: ULTRASOUND, UPPER GI TRACT, ENDOSCOPIC;  Surgeon: Charles Wadsworth MD;  Location: [address]);  Service: Endoscopy;  Laterality: N/A;  PM prep    ENDOSCOPIC ULTRASOUND OF UPPER GASTROINTESTINAL TRACT N/A 6/13/2019    Procedure: ULTRASOUND, UPPER GI TRACT, ENDOSCOPIC;  Surgeon: Aureliano Lyn MD;  Location: [address]);  Service: Endoscopy;  Laterality: N/A;    ENDOSCOPIC ULTRASOUND OF UPPER GASTROINTESTINAL TRACT N/A 8/29/2019    Procedure: ULTRASOUND, UPPER GI TRACT, ENDOSCOPIC;  Surgeon: Aureliano Lyn MD;  Location: [address]);  Service: Endoscopy;  Laterality: N/A;  main campus     No family history on file.  Social History     Tobacco Use    Smoking status: Light Tobacco Smoker     Types: Cigarettes   Substance Use Topics    Alcohol use: Yes     Comment: previous extensive use    Drug use: Yes     Comment: Marijuana on occasion     Review of Systems   Constitutional: Negative for chills and fever.   HENT: Negative for sore throat.    Respiratory: Negative for shortness of breath.    Cardiovascular: Negative for chest pain.   Gastrointestinal: Positive for nausea and vomiting. Negative for abdominal pain and diarrhea.   Genitourinary: Negative for dysuria.   Musculoskeletal: Negative for back pain.   Skin: Negative for rash.   Neurological: Positive for dizziness, weakness (generalized) and light-headedness.   Hematological: Does not bruise/bleed easily.   All other systems reviewed and are negative.      Physical Exam     Initial Vitals [08/13/20 1011]   BP Pulse Resp Temp SpO2   (!) 90/53 88 20 97.7 °F (36.5 °C) 97 %      MAP       --         Physical Exam    Nursing note and vitals reviewed.  Constitutional: He appears well-developed and well-nourished. He is not diaphoretic. No distress.   HENT:   Head: Normocephalic  and atraumatic.   Right Ear: External ear normal.   Left Ear: External ear normal.   Eyes: EOM are normal.   Cardiovascular: Normal rate, regular rhythm and normal heart sounds. Exam reveals no friction rub.    No murmur heard.  Pulmonary/Chest: Breath sounds normal. No respiratory distress. He has no wheezes. He has no rhonchi. He has no rales.   Abdominal: Soft. He exhibits no distension. There is no abdominal tenderness. There is no rebound and no guarding.   Musculoskeletal: Normal range of motion. No edema.   Neurological: He is alert and oriented to person, place, and time. GCS score is 15. GCS eye subscore is 4. GCS verbal subscore is 5. GCS motor subscore is 6.   Skin: Skin is warm and dry. Capillary refill takes less than 2 seconds.   Psychiatric: He has a normal mood and affect.         ED Course   Critical Care    Date/Time: 8/13/2020 1:45 PM  Performed by: Donya Chen MD  Authorized by: Donya Chen MD   Total critical care time (exclusive of procedural time) : 35 minutes  Critical care time was exclusive of separately billable procedures and treating other patients.  Critical care was necessary to treat or prevent imminent or life-threatening deterioration of the following conditions: renal failure and cardiac failure.  Critical care was time spent personally by me on the following activities: blood draw for specimens, discussions with consultants, evaluation of patient's response to treatment, obtaining history from patient or surrogate, ordering and review of laboratory studies, pulse oximetry, review of old charts, development of treatment plan with patient or surrogate, examination of patient, ordering and performing treatments and interventions, ordering and review of radiographic studies and re-evaluation of patient's condition.    PIV placed under US guidance    Date/Time: 8/13/2020 1:45 PM  Performed by: Donya Chen MD  Authorized by: Donya Chen MD   Location (Ext  Jugular): Left.  Area Prepped With: Chlorohexidine.  Catheter Size: 20 ga.  Number of attempts: 1  Fixation/Dressing: Taped in place and Tegaderm.  Patient tolerance: Patient tolerated the procedure well with no immediate complications        Labs Reviewed   CBC W/ AUTO DIFFERENTIAL - Abnormal; Notable for the following components:       Result Value    RBC 4.04 (*)     Hemoglobin 11.5 (*)     Hematocrit 34.3 (*)     RDW 14.6 (*)     Immature Granulocytes 0.6 (*)     Immature Grans (Abs) 0.05 (*)     Gran% 74.9 (*)     Lymph% 12.2 (*)     All other components within normal limits   COMPREHENSIVE METABOLIC PANEL - Abnormal; Notable for the following components:    Sodium 127 (*)     Potassium 6.8 (*)     Chloride 91 (*)     CO2 17 (*)     Glucose 207 (*)     BUN, Bld 87 (*)     Creatinine 14.2 (*)     Calcium 8.5 (*)     Total Protein 8.9 (*)     Anion Gap 19 (*)     eGFR if  4 (*)     eGFR if non  4 (*)     All other components within normal limits    Narrative:      K  critical result(s) called and verbal readback obtained from   JAYANT Orozco by CHETNA 08/13/2020 11:09   COMPREHENSIVE METABOLIC PANEL - Abnormal; Notable for the following components:    Sodium 125 (*)     Potassium 7.2 (*)     Chloride 94 (*)     CO2 13 (*)     Glucose 179 (*)     BUN, Bld 87 (*)     Creatinine 13.2 (*)     Calcium 8.2 (*)     Total Protein 8.6 (*)     Anion Gap 18 (*)     eGFR if  5 (*)     eGFR if non  4 (*)     All other components within normal limits    Narrative:      K  critical result(s) called and verbal readback obtained from AYANNA Sy RN by CHETNA 08/13/2020 12:38   CK - Abnormal; Notable for the following components:     (*)     All other components within normal limits   LIPASE   MAGNESIUM   TROPONIN I   TSH   TROPONIN I   MAGNESIUM   TSH   CK   SARS-COV-2 RNA AMPLIFICATION, QUAL   URINALYSIS, REFLEX TO URINE CULTURE   POCT GLUCOSE MONITORING CONTINUOUS         ECG Results          EKG 12-lead (Final result)  Result time 08/13/20 13:07:25    Final result by Interface, Lab In [facility] (08/13/20 13:07:25)                 Narrative:    Test Reason : R42,    Vent. Rate : 146 BPM     Atrial Rate : 086 BPM     P-R Int : 138 ms          QRS Dur : 078 ms      QT Int : 318 ms       P-R-T Axes : 069 -17 037 degrees     QTc Int : 495 ms    Sinus rhythm with frequent Premature ventricular complexes  Otherwise normal ECG  No previous ECGs available  Confirmed by Ameya Laurent MD ([id number]) on 8/13/2020 1:07:13 PM    Referred By: System System           Confirmed By:Ameya Laurent MD                            Imaging Results          X-Ray Chest PA And Lateral (Final result)  Result time 08/13/20 11:21:45    Final result by Regi Davis MD (08/13/20 11:21:45)                 Impression:      No convincing evidence of disease.      Electronically signed by: Regi Davis MD  Date:    08/13/2020  Time:    11:21             Narrative:    EXAMINATION:  XR CHEST PA AND LATERAL    CLINICAL HISTORY:  Weakness    TECHNIQUE:  PA and lateral views of the chest were performed.    COMPARISON:  None    FINDINGS:  Soft tissues of the patient's arms project over lateral view obscuring some detail of the retrosternal airspace and mediastinal margins.    Mediastinal structures are midline. Cardiac silhouette and pulmonary vascular distribution are normal.    Lung volumes are normal and symmetric. I detect no pulmonary disease, pleural fluid, lymph node enlargement, cardiac decompensation, pneumothorax, pneumomediastinum, pneumoperitoneum or significant osseous abnormality.                                 Medical Decision Making:   Initial Assessment:   Patient here with generalized weakness and hypotension  Differential Diagnosis:   Electrolyte abnormality, hypoglycemia, CVA, spinal cord abnormality, infectious causes, Guillain [place], neuromuscular junction disease, muscle disease,  endocrine abnormalities, sepsis, ARF    Independently Interpreted Test(s):   I have ordered and independently interpreted EKG Reading(s) - see prior notes  Clinical Tests:   Lab Tests: Ordered and Reviewed  Radiological Study: Ordered and Reviewed  Medical Tests: Ordered and Reviewed  ED Management:  - EKG with peaked T waves.  - CMP reveals ARF, Cr > 14 and hyperkalemia  Hyperkalemia treated with calcium, dextrose, insulin, bicarb and albuterol  Also given IVFs.  Consulted nephrology and internal medicine.                   ED Course as of Aug 13 1349   Thu Aug 13, 2020   1029 BP(!): 90/53 [LD]   1029 Temp: 97.7 °F (36.5 °C) [LD]   1029 Pulse: 88 [LD]   1029 Resp: 20 [LD]   1029 SpO2: 97 % [LD]   1031 EKG with sinus rhythm with rate of 75 bpm.  + peaked T waves.  + LAD, no STEMI    [LD]   1040 BP(!): 103/58 [LD]   1040 Pulse: 85 [LD]   1040 Resp: 20 [LD]   1040 SpO2: 98 % [LD]   1147 CPK(!): 700 [LD]   1313 Potassium(!!): 7.2 [LD]   1314 Creatinine(!): 13.2 [LD]   1314 SARS-CoV-2 RNA, Amplification, Qual: Negative [LD]   1339 Discussed with Dr Willis who accepts patient.    [LD]   1340 Nephrology consulted    [LD]      ED Course User Index  [LD] Donya Chen MD                Clinical Impression:       ICD-10-CM ICD-9-CM   1. Hypotension, unspecified hypotension type  I95.9 458.9   2. Dizziness  R42 780.4   3. Generalized weakness  R53.1 780.79   4. Acute renal failure  N17.9 584.9   5. Hyperkalemia  E87.5 276.7           Disposition:   Disposition: Admitted  Condition: Stable     ED Disposition Condition    Admit              I, Donya Chen,  personally performed the services described in this documentation. All medical record entries made by the scribe were at my direction and in my presence.  I have reviewed the chart and agree that the record reflects my personal performance and is accurate and complete. Donya Chen M.D. 1:48 PM08/13/2020               Donya Chen MD  08/13/20 0188

## 2020-08-13 NOTE — ED NOTES
Pt presents to the ED w/ c/ of generalized weakness for the past 3 days. Pt reports vomiting for the past 2 days as well with intermittent nausea. Pt denies abd pain. Pt reports that he was at his GI appointment today for his pancreatitis when he was found to have hypotension. Pt denies chest pain or SOB. Pt reports that the other day he was standing up and felt dizzy and weak and had to sit down. Pt denies dizziness at this time. Pt is connected to cardiac monitor, BP cuff, and pulse ox. Will continue to monitor.

## 2020-08-14 ENCOUNTER — CLINICAL SUPPORT (OUTPATIENT)
Dept: SMOKING CESSATION | Facility: CLINIC | Age: 45
End: 2020-08-14
Payer: COMMERCIAL

## 2020-08-14 DIAGNOSIS — F17.210 CIGARETTE SMOKER: Primary | ICD-10-CM

## 2020-08-14 LAB
ALBUMIN SERPL BCP-MCNC: 3.5 G/DL (ref 3.5–5.2)
ALP SERPL-CCNC: 104 U/L (ref 55–135)
ALT SERPL W/O P-5'-P-CCNC: 15 U/L (ref 10–44)
ANION GAP SERPL CALC-SCNC: 13 MMOL/L (ref 8–16)
AST SERPL-CCNC: 14 U/L (ref 10–40)
BASOPHILS # BLD AUTO: 0.01 K/UL (ref 0–0.2)
BASOPHILS NFR BLD: 0.2 % (ref 0–1.9)
BILIRUB SERPL-MCNC: 0.2 MG/DL (ref 0.1–1)
BUN SERPL-MCNC: 75 MG/DL (ref 6–20)
CALCIUM SERPL-MCNC: 8.2 MG/DL (ref 8.7–10.5)
CHLORIDE SERPL-SCNC: 100 MMOL/L (ref 95–110)
CO2 SERPL-SCNC: 19 MMOL/L (ref 23–29)
CREAT SERPL-MCNC: 7.1 MG/DL (ref 0.5–1.4)
DIFFERENTIAL METHOD: ABNORMAL
EOSINOPHIL # BLD AUTO: 0.1 K/UL (ref 0–0.5)
EOSINOPHIL NFR BLD: 1.5 % (ref 0–8)
ERYTHROCYTE [DISTWIDTH] IN BLOOD BY AUTOMATED COUNT: 14.8 % (ref 11.5–14.5)
EST. GFR  (AFRICAN AMERICAN): 10 ML/MIN/1.73 M^2
EST. GFR  (NON AFRICAN AMERICAN): 9 ML/MIN/1.73 M^2
GLUCOSE SERPL-MCNC: 214 MG/DL (ref 70–110)
HCT VFR BLD AUTO: 29.2 % (ref 40–54)
HGB BLD-MCNC: 9.5 G/DL (ref 14–18)
IMM GRANULOCYTES # BLD AUTO: 0.02 K/UL (ref 0–0.04)
IMM GRANULOCYTES NFR BLD AUTO: 0.4 % (ref 0–0.5)
LYMPHOCYTES # BLD AUTO: 1 K/UL (ref 1–4.8)
LYMPHOCYTES NFR BLD: 21.4 % (ref 18–48)
MAGNESIUM SERPL-MCNC: 1.5 MG/DL (ref 1.6–2.6)
MCH RBC QN AUTO: 27.9 PG (ref 27–31)
MCHC RBC AUTO-ENTMCNC: 32.5 G/DL (ref 32–36)
MCV RBC AUTO: 86 FL (ref 82–98)
MONOCYTES # BLD AUTO: 0.6 K/UL (ref 0.3–1)
MONOCYTES NFR BLD: 12.1 % (ref 4–15)
NEUTROPHILS # BLD AUTO: 2.9 K/UL (ref 1.8–7.7)
NEUTROPHILS NFR BLD: 64.4 % (ref 38–73)
NRBC BLD-RTO: 0 /100 WBC
OSMOLALITY SERPL: 310 MOSM/KG (ref 280–300)
PHOSPHATE SERPL-MCNC: 4.3 MG/DL (ref 2.7–4.5)
PLATELET # BLD AUTO: 224 K/UL (ref 150–350)
PMV BLD AUTO: 10.2 FL (ref 9.2–12.9)
POCT GLUCOSE: 193 MG/DL (ref 70–110)
POCT GLUCOSE: 219 MG/DL (ref 70–110)
POCT GLUCOSE: 224 MG/DL (ref 70–110)
POCT GLUCOSE: 240 MG/DL (ref 70–110)
POTASSIUM SERPL-SCNC: 4.4 MMOL/L (ref 3.5–5.1)
PROT SERPL-MCNC: 7.6 G/DL (ref 6–8.4)
RBC # BLD AUTO: 3.4 M/UL (ref 4.6–6.2)
SODIUM SERPL-SCNC: 132 MMOL/L (ref 136–145)
WBC # BLD AUTO: 4.54 K/UL (ref 3.9–12.7)

## 2020-08-14 PROCEDURE — 99999 PR PBB SHADOW E&M-EST. PATIENT-LVL I: ICD-10-PCS | Mod: PBBFAC,,,

## 2020-08-14 PROCEDURE — 80053 COMPREHEN METABOLIC PANEL: CPT

## 2020-08-14 PROCEDURE — 85025 COMPLETE CBC W/AUTO DIFF WBC: CPT

## 2020-08-14 PROCEDURE — 83735 ASSAY OF MAGNESIUM: CPT

## 2020-08-14 PROCEDURE — 99407 BEHAV CHNG SMOKING > 10 MIN: CPT | Mod: S$GLB,,,

## 2020-08-14 PROCEDURE — 63600175 PHARM REV CODE 636 W HCPCS: Performed by: FAMILY MEDICINE

## 2020-08-14 PROCEDURE — 99999 PR PBB SHADOW E&M-EST. PATIENT-LVL I: CPT | Mod: PBBFAC,,,

## 2020-08-14 PROCEDURE — 25000003 PHARM REV CODE 250: Performed by: FAMILY MEDICINE

## 2020-08-14 PROCEDURE — 99407 PR TOBACCO USE CESSATION INTENSIVE >10 MINUTES: ICD-10-PCS | Mod: S$GLB,,,

## 2020-08-14 PROCEDURE — 84100 ASSAY OF PHOSPHORUS: CPT

## 2020-08-14 PROCEDURE — 36415 COLL VENOUS BLD VENIPUNCTURE: CPT

## 2020-08-14 PROCEDURE — 63600175 PHARM REV CODE 636 W HCPCS: Performed by: STUDENT IN AN ORGANIZED HEALTH CARE EDUCATION/TRAINING PROGRAM

## 2020-08-14 PROCEDURE — 11000001 HC ACUTE MED/SURG PRIVATE ROOM

## 2020-08-14 RX ORDER — LANOLIN ALCOHOL/MO/W.PET/CERES
400 CREAM (GRAM) TOPICAL 2 TIMES DAILY
Status: DISCONTINUED | OUTPATIENT
Start: 2020-08-14 | End: 2020-08-15 | Stop reason: HOSPADM

## 2020-08-14 RX ADMIN — SODIUM CHLORIDE, SODIUM LACTATE, POTASSIUM CHLORIDE, AND CALCIUM CHLORIDE: .6; .31; .03; .02 INJECTION, SOLUTION INTRAVENOUS at 05:08

## 2020-08-14 RX ADMIN — INSULIN ASPART 4 UNITS: 100 INJECTION, SOLUTION INTRAVENOUS; SUBCUTANEOUS at 05:08

## 2020-08-14 RX ADMIN — HEPARIN SODIUM 5000 UNITS: 5000 INJECTION INTRAVENOUS; SUBCUTANEOUS at 05:08

## 2020-08-14 RX ADMIN — INSULIN DETEMIR 10 UNITS: 100 INJECTION, SOLUTION SUBCUTANEOUS at 09:08

## 2020-08-14 RX ADMIN — INSULIN ASPART 4 UNITS: 100 INJECTION, SOLUTION INTRAVENOUS; SUBCUTANEOUS at 12:08

## 2020-08-14 RX ADMIN — INSULIN ASPART 2 UNITS: 100 INJECTION, SOLUTION INTRAVENOUS; SUBCUTANEOUS at 05:08

## 2020-08-14 RX ADMIN — SODIUM CHLORIDE, SODIUM LACTATE, POTASSIUM CHLORIDE, AND CALCIUM CHLORIDE: .6; .31; .03; .02 INJECTION, SOLUTION INTRAVENOUS at 10:08

## 2020-08-14 RX ADMIN — MAGNESIUM OXIDE 400 MG (241.3 MG MAGNESIUM) TABLET 400 MG: TABLET at 08:08

## 2020-08-14 RX ADMIN — HEPARIN SODIUM 5000 UNITS: 5000 INJECTION INTRAVENOUS; SUBCUTANEOUS at 01:08

## 2020-08-14 RX ADMIN — INSULIN ASPART 2 UNITS: 100 INJECTION, SOLUTION INTRAVENOUS; SUBCUTANEOUS at 09:08

## 2020-08-14 RX ADMIN — MAGNESIUM OXIDE 400 MG (241.3 MG MAGNESIUM) TABLET 400 MG: TABLET at 09:08

## 2020-08-14 RX ADMIN — HEPARIN SODIUM 5000 UNITS: 5000 INJECTION INTRAVENOUS; SUBCUTANEOUS at 09:08

## 2020-08-14 RX ADMIN — SODIUM CHLORIDE, SODIUM LACTATE, POTASSIUM CHLORIDE, AND CALCIUM CHLORIDE: .6; .31; .03; .02 INJECTION, SOLUTION INTRAVENOUS at 09:08

## 2020-08-14 NOTE — PLAN OF CARE
TN met with patient to complete discharge assessment. Currently, patient lives at home with family and is independent with ADL's. No DME or HH noted. Per patient, upon discharge is family will provide transportation home and will be available to help as needed. Patient's PCP is Dr. Nava.      TN updated whiteboard with contact information. Discharge brochure given to patient. TN will continue to follow throughout transitions of care and will assist with discharge needs.           08/14/20 1025   Discharge Assessment   Assessment Type Discharge Planning Assessment   Confirmed/corrected address and phone number on facesheet? Yes   Assessment information obtained from? Patient;Medical Record   Expected Length of Stay (days) 2   Communicated expected length of stay with patient/caregiver yes   Prior to hospitilization cognitive status: Alert/Oriented   Prior to hospitalization functional status: Independent   Current Functional Status: Independent   Lives With other relative(s)   Able to Return to Prior Arrangements yes   Is patient able to care for self after discharge? Yes   Readmission Within the Last 30 Days no previous admission in last 30 days   Patient currently being followed by outpatient case management? No   Patient currently receives any other outside agency services? No   Equipment Currently Used at Home none   Do you have any problems affording any of your prescribed medications? No   Is the patient taking medications as prescribed? yes   Does the patient have transportation home? Yes   Discharge Plan A Home with family   Discharge Plan B Other   DME Needed Upon Discharge  none   Patient/Family in Agreement with Plan yes

## 2020-08-14 NOTE — PROGRESS NOTES
Individual Follow-Up Form    8/14/2020    Quit Date: To be determined    Clinical Status of Patient: Inpatient    Length of Service: 30 minutes    Comments: Smoking cessation education provided. Pt first tried a cigarette at the age of 11 or 12 and started smoking regularly at age 27. He states ready to quit- he was enrolled in the [employer] during this encounter.      Diagnosis: F17.210    Next Visit: Ambulatory referral to Smoking Cessation program following hospital discharge.

## 2020-08-14 NOTE — PLAN OF CARE
Problem: Adult Inpatient Plan of Care  Goal: Plan of Care Review  Outcome: Ongoing, Progressing  Mr. Gonzalez is resting and medications were given per orders. IV fluids infusing. Blood glucose and cardiac monitoring continued. No complaints of PAIN,NV,SOB. VSS; Safety maintained.

## 2020-08-14 NOTE — PLAN OF CARE
Pt alert and oriented x4. No c/o pain. Tolerating a diabetic diet, no n/v. Voiding per urinal.Continuous LR infusing at 125 mL/hr. Blood glucose monitoring  and SSI administered. Safety maintained, call light w/in reach.

## 2020-08-14 NOTE — PROGRESS NOTES
[facility] Hospital Medicine  Progress Note    Patient Name: Wallace Gonzalez  MRN: [id number]  Patient Class: IP- Inpatient   Admission Date: 8/13/2020  Length of Stay: 1 days  Attending Physician: Annabel Wright*  Primary Care Provider: Roberto Carlos Nava Jr, MD        Subjective:     Principal Problem:Acute renal failure        HPI:  Patient is a 44 y/o male with a PMHx of Bronchitis, DM, HTN, HLD, chronic Pancreatitis, presents top ED with 3 days history od 1-2 episode of daily diarrhea with associated abdominal pain, generalized weakness, dizziness, light-headedness. He denies fever, chills, urinary symptoms, nausea or vomiting. In the ED patient labs with elevated Cr and potassium    Overview/Hospital Course:  No notes on file    Interval History: awake and alert, no new complaint, diuresing and renal function improving.   Na improving.   Appreciates renal rec's       Review of Systems   Constitutional: Negative for chills and fever.   Respiratory: Negative for chest tightness and shortness of breath.    Cardiovascular: Negative for chest pain and palpitations.   Gastrointestinal: Negative for abdominal distention, abdominal pain, blood in stool, diarrhea, nausea and vomiting.   Genitourinary: Negative for dysuria and flank pain.   Musculoskeletal: Negative for arthralgias and myalgias.   Skin: Negative for color change and rash.   Neurological: Negative for dizziness, weakness and headaches.   Psychiatric/Behavioral: Negative for agitation.     Objective:     Vital Signs (Most Recent):  Temp: 98.6 °F (37 °C) (08/14/20 1213)  Pulse: 87 (08/14/20 1551)  Resp: 18 (08/14/20 1213)  BP: 129/65 (08/14/20 1213)  SpO2: 100 % (08/14/20 1213) Vital Signs (24h Range):  Temp:  [97.5 °F (36.4 °C)-98.6 °F (37 °C)] 98.6 °F (37 °C)  Pulse:  [] 87  Resp:  [18-20] 18  SpO2:  [97 %-100 %] 100 %  BP: ()/(53-67) 129/65     Weight: 101.5 kg (223 lb 12.3 oz)  Body mass index is 28.35  kg/m².    Intake/Output Summary (Last 24 hours) at 8/14/2020 1648  Last data filed at 8/14/2020 1300  Gross per 24 hour   Intake 1625 ml   Output 3475 ml   Net -1850 ml      Physical Exam  Constitutional:       Appearance: He is well-developed.   HENT:      Head: Normocephalic and atraumatic.   Neck:      Musculoskeletal: Normal range of motion and neck supple.   Cardiovascular:      Rate and Rhythm: Normal rate and regular rhythm.      Heart sounds: Normal heart sounds. No murmur. No friction rub. No gallop.    Pulmonary:      Effort: Pulmonary effort is normal.      Breath sounds: Normal breath sounds.   Chest:      Chest wall: No tenderness.   Abdominal:      General: Bowel sounds are normal. There is no distension.      Palpations: Abdomen is soft.      Tenderness: There is no abdominal tenderness.   Musculoskeletal: Normal range of motion.   Skin:     General: Skin is warm.      Capillary Refill: Capillary refill takes less than 2 seconds.   Neurological:      Mental Status: He is alert and oriented to person, place, and time.   Psychiatric:         Mood and Affect: Mood normal.         Significant Labs:   A1C:   Recent Labs   Lab 08/13/20  1515   HGBA1C 10.6*     CBC:   Recent Labs   Lab 08/13/20  1047 08/14/20  0423   WBC 9.07 4.54   HGB 11.5* 9.5*   HCT 34.3* 29.2*    224     CMP:   Recent Labs   Lab 08/13/20  1047 08/13/20  1208 08/13/20  1515 08/13/20 2003 08/14/20  0423   * 125* 131*  131* 130* 132*   K 6.8* 7.2* 5.2*  5.2* 5.1 4.4   CL 91* 94* 97  97 98 100   CO2 17* 13* 17*  17* 16* 19*   * 179* 127*  127* 173* 214*   BUN 87* 87* 85*  85* 83* 75*   CREATININE 14.2* 13.2* 11.8*  11.8* 10.6* 7.1*   CALCIUM 8.5* 8.2* 7.8*  7.8* 8.0* 8.2*   PROT 8.9* 8.6*  --   --  7.6   ALBUMIN 4.3 4.2 3.5 3.8 3.5   BILITOT 0.4 0.3  --   --  0.2   ALKPHOS 113 112  --   --  104   AST 18 18  --   --  14   ALT 19 19  --   --  15   ANIONGAP 19* 18* 17*  17* 16 13   EGFRNONAA 4* 4* 5*  5* 5* 9*      Lactic Acid:   Recent Labs   Lab 08/13/20  1515   LACTATE 1.6     Lipase:   Recent Labs   Lab 08/13/20  1047   LIPASE 39     Lipid Panel: No results for input(s): CHOL, HDL, LDLCALC, TRIG, CHOLHDL in the last 48 hours.  Troponin:   Recent Labs   Lab 08/13/20  1047   TROPONINI <0.006     TSH:   Recent Labs   Lab 08/13/20  1047   TSH 1.984     Urine Culture: No results for input(s): LABURIN in the last 48 hours.  Urine Studies:   Recent Labs   Lab 08/13/20  1511   COLORU Yellow  Yellow   APPEARANCEUA Clear  Clear   PHUR 6.0  6.0   SPECGRAV >=1.030*  >=1.030*   PROTEINUA 1+*  1+*   GLUCUA Negative  Negative   KETONESU Negative  Negative   BILIRUBINUA Negative  Negative   OCCULTUA 1+*  1+*   NITRITE Negative  Negative   UROBILINOGEN Negative  Negative   LEUKOCYTESUR Negative  Negative   RBCUA 5*   WBCUA 3   BACTERIA Few*   HYALINECASTS 0       Significant Imaging: I have reviewed all pertinent imaging results/findings within the past 24 hours.      Assessment/Plan:      * Acute renal failure  Hyperkalemia  Hypotension  CRISTOFER likely due to diarrhea  K shifted in the ED  Avoid nephrotoxic meds  Renally dose meds  Kidney USS  strict input/output  Consult nephrology- appreciates rec's         Essential hypertension  Hold Lisinopril, Metoprolol, Clonidine due to hypotension      Abdominal pain  Diarrhea  Pain med prn      Type 2 diabetes mellitus  Hold Metformin  Levemir  Low dose SSI  NPO  Accucheck      Hyponatremia  IVF  Nephrology on board      Asthma  stable      Pancreatitis, acute          VTE Risk Mitigation (From admission, onward)         Ordered     heparin (porcine) injection 5,000 Units  Every 8 hours      08/13/20 1733     IP VTE LOW RISK PATIENT  Once      08/13/20 1733                Discharge Planning   URIEL:      Code Status: Full Code   Is the patient medically ready for discharge?:     Reason for patient still in hospital (select all that apply): Patient trending condition  Discharge Plan A:  Home with family                  Annabel N HermelindoocentMD Nish  Department of Hospital Medicine   [facility]

## 2020-08-14 NOTE — SUBJECTIVE & OBJECTIVE
Interval History: awake and alert, no new complaint, diuresing and renal function improving.   Na improving.   Appreciates renal rec's       Review of Systems   Constitutional: Negative for chills and fever.   Respiratory: Negative for chest tightness and shortness of breath.    Cardiovascular: Negative for chest pain and palpitations.   Gastrointestinal: Negative for abdominal distention, abdominal pain, blood in stool, diarrhea, nausea and vomiting.   Genitourinary: Negative for dysuria and flank pain.   Musculoskeletal: Negative for arthralgias and myalgias.   Skin: Negative for color change and rash.   Neurological: Negative for dizziness, weakness and headaches.   Psychiatric/Behavioral: Negative for agitation.     Objective:     Vital Signs (Most Recent):  Temp: 98.6 °F (37 °C) (08/14/20 1213)  Pulse: 87 (08/14/20 1551)  Resp: 18 (08/14/20 1213)  BP: 129/65 (08/14/20 1213)  SpO2: 100 % (08/14/20 1213) Vital Signs (24h Range):  Temp:  [97.5 °F (36.4 °C)-98.6 °F (37 °C)] 98.6 °F (37 °C)  Pulse:  [] 87  Resp:  [18-20] 18  SpO2:  [97 %-100 %] 100 %  BP: ()/(53-67) 129/65     Weight: 101.5 kg (223 lb 12.3 oz)  Body mass index is 28.35 kg/m².    Intake/Output Summary (Last 24 hours) at 8/14/2020 1648  Last data filed at 8/14/2020 1300  Gross per 24 hour   Intake 1625 ml   Output 3475 ml   Net -1850 ml      Physical Exam  Constitutional:       Appearance: He is well-developed.   HENT:      Head: Normocephalic and atraumatic.   Neck:      Musculoskeletal: Normal range of motion and neck supple.   Cardiovascular:      Rate and Rhythm: Normal rate and regular rhythm.      Heart sounds: Normal heart sounds. No murmur. No friction rub. No gallop.    Pulmonary:      Effort: Pulmonary effort is normal.      Breath sounds: Normal breath sounds.   Chest:      Chest wall: No tenderness.   Abdominal:      General: Bowel sounds are normal. There is no distension.      Palpations: Abdomen is soft.      Tenderness:  There is no abdominal tenderness.   Musculoskeletal: Normal range of motion.   Skin:     General: Skin is warm.      Capillary Refill: Capillary refill takes less than 2 seconds.   Neurological:      Mental Status: He is alert and oriented to person, place, and time.   Psychiatric:         Mood and Affect: Mood normal.         Significant Labs:   A1C:   Recent Labs   Lab 08/13/20  1515   HGBA1C 10.6*     CBC:   Recent Labs   Lab 08/13/20  1047 08/14/20  0423   WBC 9.07 4.54   HGB 11.5* 9.5*   HCT 34.3* 29.2*    224     CMP:   Recent Labs   Lab 08/13/20  1047 08/13/20  1208 08/13/20  1515 08/13/20 2003 08/14/20  0423   * 125* 131*  131* 130* 132*   K 6.8* 7.2* 5.2*  5.2* 5.1 4.4   CL 91* 94* 97  97 98 100   CO2 17* 13* 17*  17* 16* 19*   * 179* 127*  127* 173* 214*   BUN 87* 87* 85*  85* 83* 75*   CREATININE 14.2* 13.2* 11.8*  11.8* 10.6* 7.1*   CALCIUM 8.5* 8.2* 7.8*  7.8* 8.0* 8.2*   PROT 8.9* 8.6*  --   --  7.6   ALBUMIN 4.3 4.2 3.5 3.8 3.5   BILITOT 0.4 0.3  --   --  0.2   ALKPHOS 113 112  --   --  104   AST 18 18  --   --  14   ALT 19 19  --   --  15   ANIONGAP 19* 18* 17*  17* 16 13   EGFRNONAA 4* 4* 5*  5* 5* 9*     Lactic Acid:   Recent Labs   Lab 08/13/20  1515   LACTATE 1.6     Lipase:   Recent Labs   Lab 08/13/20  1047   LIPASE 39     Lipid Panel: No results for input(s): CHOL, HDL, LDLCALC, TRIG, CHOLHDL in the last 48 hours.  Troponin:   Recent Labs   Lab 08/13/20  1047   TROPONINI <0.006     TSH:   Recent Labs   Lab 08/13/20  1047   TSH 1.984     Urine Culture: No results for input(s): LABURIN in the last 48 hours.  Urine Studies:   Recent Labs   Lab 08/13/20  1511   COLORU Yellow  Yellow   APPEARANCEUA Clear  Clear   PHUR 6.0  6.0   SPECGRAV >=1.030*  >=1.030*   PROTEINUA 1+*  1+*   GLUCUA Negative  Negative   KETONESU Negative  Negative   BILIRUBINUA Negative  Negative   OCCULTUA 1+*  1+*   NITRITE Negative  Negative   UROBILINOGEN Negative  Negative    LEUKOCYTESUR Negative  Negative   RBCUA 5*   WBCUA 3   BACTERIA Few*   HYALINECASTS 0       Significant Imaging: I have reviewed all pertinent imaging results/findings within the past 24 hours.

## 2020-08-14 NOTE — PLAN OF CARE
Pt alert and oriented x4. Had a large appetite ate two meal trays. Urinal at bedside, no c/o pain, n/v or dizziness. Bed locked in lowest position, safety maintained and call light w/ in reach.

## 2020-08-15 VITALS
RESPIRATION RATE: 17 BRPM | HEIGHT: 75 IN | DIASTOLIC BLOOD PRESSURE: 60 MMHG | BODY MASS INDEX: 27.44 KG/M2 | WEIGHT: 220.69 LBS | HEART RATE: 85 BPM | SYSTOLIC BLOOD PRESSURE: 133 MMHG | OXYGEN SATURATION: 100 % | TEMPERATURE: 98 F

## 2020-08-15 LAB
ALBUMIN SERPL BCP-MCNC: 3.5 G/DL (ref 3.5–5.2)
ALP SERPL-CCNC: 88 U/L (ref 55–135)
ALT SERPL W/O P-5'-P-CCNC: 12 U/L (ref 10–44)
ANION GAP SERPL CALC-SCNC: 10 MMOL/L (ref 8–16)
AST SERPL-CCNC: 14 U/L (ref 10–40)
BASOPHILS # BLD AUTO: 0.02 K/UL (ref 0–0.2)
BASOPHILS NFR BLD: 0.4 % (ref 0–1.9)
BILIRUB SERPL-MCNC: 0.2 MG/DL (ref 0.1–1)
BUN SERPL-MCNC: 33 MG/DL (ref 6–20)
CALCIUM SERPL-MCNC: 9 MG/DL (ref 8.7–10.5)
CHLORIDE SERPL-SCNC: 105 MMOL/L (ref 95–110)
CO2 SERPL-SCNC: 23 MMOL/L (ref 23–29)
CREAT SERPL-MCNC: 1.6 MG/DL (ref 0.5–1.4)
DIFFERENTIAL METHOD: ABNORMAL
EOSINOPHIL # BLD AUTO: 0.1 K/UL (ref 0–0.5)
EOSINOPHIL NFR BLD: 2.1 % (ref 0–8)
ERYTHROCYTE [DISTWIDTH] IN BLOOD BY AUTOMATED COUNT: 14.6 % (ref 11.5–14.5)
EST. GFR  (AFRICAN AMERICAN): 60 ML/MIN/1.73 M^2
EST. GFR  (NON AFRICAN AMERICAN): 52 ML/MIN/1.73 M^2
GLUCOSE SERPL-MCNC: 127 MG/DL (ref 70–110)
HCT VFR BLD AUTO: 32.1 % (ref 40–54)
HGB BLD-MCNC: 10.3 G/DL (ref 14–18)
IMM GRANULOCYTES # BLD AUTO: 0.01 K/UL (ref 0–0.04)
IMM GRANULOCYTES NFR BLD AUTO: 0.2 % (ref 0–0.5)
LYMPHOCYTES # BLD AUTO: 1.6 K/UL (ref 1–4.8)
LYMPHOCYTES NFR BLD: 34.2 % (ref 18–48)
MAGNESIUM SERPL-MCNC: 1.4 MG/DL (ref 1.6–2.6)
MCH RBC QN AUTO: 28 PG (ref 27–31)
MCHC RBC AUTO-ENTMCNC: 32.1 G/DL (ref 32–36)
MCV RBC AUTO: 87 FL (ref 82–98)
MONOCYTES # BLD AUTO: 0.8 K/UL (ref 0.3–1)
MONOCYTES NFR BLD: 17.3 % (ref 4–15)
NEUTROPHILS # BLD AUTO: 2.1 K/UL (ref 1.8–7.7)
NEUTROPHILS NFR BLD: 45.8 % (ref 38–73)
NRBC BLD-RTO: 0 /100 WBC
PHOSPHATE SERPL-MCNC: 2 MG/DL (ref 2.7–4.5)
PLATELET # BLD AUTO: 230 K/UL (ref 150–350)
PMV BLD AUTO: 10.2 FL (ref 9.2–12.9)
POCT GLUCOSE: 130 MG/DL (ref 70–110)
POTASSIUM SERPL-SCNC: 4.5 MMOL/L (ref 3.5–5.1)
PROT SERPL-MCNC: 7.9 G/DL (ref 6–8.4)
RBC # BLD AUTO: 3.68 M/UL (ref 4.6–6.2)
SODIUM SERPL-SCNC: 138 MMOL/L (ref 136–145)
WBC # BLD AUTO: 4.68 K/UL (ref 3.9–12.7)

## 2020-08-15 PROCEDURE — 80053 COMPREHEN METABOLIC PANEL: CPT

## 2020-08-15 PROCEDURE — 84100 ASSAY OF PHOSPHORUS: CPT

## 2020-08-15 PROCEDURE — 63600175 PHARM REV CODE 636 W HCPCS: Performed by: FAMILY MEDICINE

## 2020-08-15 PROCEDURE — 83735 ASSAY OF MAGNESIUM: CPT

## 2020-08-15 PROCEDURE — 85025 COMPLETE CBC W/AUTO DIFF WBC: CPT

## 2020-08-15 PROCEDURE — 25000003 PHARM REV CODE 250: Performed by: FAMILY MEDICINE

## 2020-08-15 PROCEDURE — 36415 COLL VENOUS BLD VENIPUNCTURE: CPT

## 2020-08-15 RX ORDER — INSULIN GLARGINE 100 [IU]/ML
25 INJECTION, SOLUTION SUBCUTANEOUS NIGHTLY
Qty: 3 BOX | Refills: 3 | Status: SHIPPED | OUTPATIENT
Start: 2020-08-15 | End: 2022-05-06

## 2020-08-15 RX ORDER — LANOLIN ALCOHOL/MO/W.PET/CERES
400 CREAM (GRAM) TOPICAL 2 TIMES DAILY
Refills: 0 | COMMUNITY
Start: 2020-08-15

## 2020-08-15 RX ADMIN — HEPARIN SODIUM 5000 UNITS: 5000 INJECTION INTRAVENOUS; SUBCUTANEOUS at 05:08

## 2020-08-15 RX ADMIN — MAGNESIUM OXIDE 400 MG (241.3 MG MAGNESIUM) TABLET 400 MG: TABLET at 08:08

## 2020-08-15 NOTE — PLAN OF CARE
Problem: Adult Inpatient Plan of Care  Goal: Plan of Care Review  Outcome: Ongoing, Progressing  Mr. Gonzalez is resting and medications were given per orders. No complaints of PAIN/NV/SOB. IV fluids infusing. Tolerating diet very well. Safety maintained.

## 2020-08-15 NOTE — PROGRESS NOTES
Nephrology Progress  Note    ?  ?  CONSULTING PHYSICIAN:  Attending Physician: No att. providers found  Reason for Consult:   Nephrology consulted for CRISTOFER management.  CHIEF COMPLAINT :Hypotension (pt has been feeling weak and has had poor vision for the past few days. also has been vomiting after eating anything for the past 4 days. was hypotensive in Dr. Tena's office pta, so was sent to the ER.)    Subjective:   Interval Hx: doing well   Cr trending down.       History of Present Illness:  Wallace Gonzalez is a 45 y.o.   male who  has a past medical history of Bronchitis, Diabetic peripheral neuropathy, DM2 (diabetes mellitus, type 2), HTN (hypertension), Hypercholesterolemia, Pancreatic duct stones, Pancreatic lesion, Pancreatitis, and Type 2 diabetes mellitus (8/13/2020).. The patient presented to the ED [place] on 8/13/2020 with a primary complaint of weakness hypotension from Dr Tena's clinic. In ER found to have sever renal failure, with multiple electrolytes derangement, acidosis. Patient reports taking 1000 Metformin TID, 800 Gabapentin TID, Lisinopril, Denies NSAIDs use, admits Cocaine use last use 5-6 month ago. Denies chest pain, SOB, no Hx of CAD. Denies EtOH use.     ?  ?  ?  ?  Past Medical History:  Past Medical History:   Diagnosis Date    Bronchitis     Diabetic peripheral neuropathy     DM2 (diabetes mellitus, type 2)     HTN (hypertension)     Hypercholesterolemia     Pancreatic duct stones     Pancreatic lesion     Pancreatitis     Type 2 diabetes mellitus 8/13/2020       Allergies:  Review of patient's allergies indicates:  No Known Allergies  ?  Medications:  Home Medications:  No current facility-administered medications for this encounter.     Current Outpatient Medications:     albuterol 90 mcg/actuation inhaler, Inhale 2 puffs into the lungs every 6 (six) hours as needed for Wheezing., Disp: , Rfl:     amitriptyline (ELAVIL) 50 MG tablet, Take 50 mg by mouth every  evening., Disp: , Rfl:     cloNIDine (CATAPRES) 0.1 MG tablet, , Disp: , Rfl: 5    gabapentin (NEURONTIN) 600 MG tablet, Take 600 mg by mouth 3 (three) times daily., Disp: , Rfl:     HYDROcodone-acetaminophen (NORCO)  mg per tablet, , Disp: , Rfl: 0    lisinopril (PRINIVIL,ZESTRIL) 5 MG tablet, Take 5 mg by mouth once daily., Disp: , Rfl:     metFORMIN (GLUCOPHAGE) 1000 MG tablet, Take 1,000 mg by mouth daily with breakfast., Disp: , Rfl:     metoprolol succinate (TOPROL-XL) 50 MG 24 hr tablet, , Disp: , Rfl: 5    omeprazole (PRILOSEC) 20 MG capsule, Take 1 capsule (20 mg total) by mouth once daily., Disp: 30 capsule, Rfl: 1    omeprazole (PRILOSEC) 40 MG capsule, , Disp: , Rfl: 4    insulin (BASAGLAR KWIKPEN U-100 INSULIN) glargine 100 units/mL (3mL) SubQ pen, Inject 25 Units into the skin every evening., Disp: 3 Box, Rfl: 3    lipase-protease-amylase 12,000-38,000-60,000 units (CREON) CpDR, Take by mouth., Disp: , Rfl:     magnesium oxide (MAG-OX) 400 mg (241.3 mg magnesium) tablet, Take 1 tablet (400 mg total) by mouth 2 (two) times daily., Disp: , Rfl: 0    ondansetron (ZOFRAN-ODT) 4 MG TbDL, Take 8 mg by mouth every 8 (eight) hours., Disp: , Rfl:   Inpatient Meds: Scheduled Meds:  Continuous Infusions:  PRN Meds:.  Review of Systems:  Review of Systems   Constitutional: Negative for chills and fever.   HENT: Negative for congestion and sore throat.    Eyes: Negative for blurred vision, double vision and photophobia.   Respiratory: Negative for cough and shortness of breath.    Cardiovascular: Negative for chest pain, palpitations and leg swelling.   Gastrointestinal: Negative for abdominal pain, diarrhea, nausea and vomiting.   Genitourinary: Negative for dysuria and urgency.   Musculoskeletal: Negative for joint pain and myalgias.   Skin: Negative for itching and rash.   Neurological: Negative for dizziness, sensory change, loss of consciousness, weakness and headaches.   Endo/Heme/Allergies:  Negative for polydipsia. Does not bruise/bleed easily.   Psychiatric/Behavioral: Negative for depression.     ?    Objective:     Vitals:    08/15/20 0821   BP: 133/60   Pulse: 85   Resp: 17   Temp: 97.9 °F (36.6 °C)       PHYSICALEXAMINATION  Physical Exam  Vitals signs and nursing note reviewed.   Constitutional:       General: He is not in acute distress.     Appearance: He is well-developed. He is not diaphoretic.   HENT:      Head: Normocephalic and atraumatic.      Mouth/Throat:      Mouth: Mucous membranes are dry.      Pharynx: No oropharyngeal exudate.   Eyes:      General: No scleral icterus.     Pupils: Pupils are equal, round, and reactive to light.   Neck:      Musculoskeletal: Normal range of motion and neck supple.      Trachea: No tracheal deviation.   Cardiovascular:      Rate and Rhythm: Regular rhythm.      Heart sounds: Normal heart sounds. No murmur.   Pulmonary:      Effort: Pulmonary effort is normal.      Breath sounds: Normal breath sounds.   Abdominal:      General: Bowel sounds are normal. There is no distension.      Palpations: Abdomen is soft.      Tenderness: There is no abdominal tenderness.   Musculoskeletal: Normal range of motion.         General: No deformity.   Skin:     General: Skin is warm and dry.      Findings: No erythema or rash.   Neurological:      Mental Status: He is alert and oriented to person, place, and time.      Cranial Nerves: No cranial nerve deficit.   Psychiatric:         Behavior: Behavior normal.         Thought Content: Thought content normal.       Laboratory Results:  Most Recent Data:  CBC:   Lab Results   Component Value Date    WBC 4.68 08/15/2020    HGB 10.3 (L) 08/15/2020    HCT 32.1 (L) 08/15/2020     08/15/2020    MCV 87 08/15/2020    RDW 14.6 (H) 08/15/2020     BMP:   Lab Results   Component Value Date     08/15/2020    K 4.5 08/15/2020     08/15/2020    CO2 23 08/15/2020    BUN 33 (H) 08/15/2020     (H) 08/15/2020     CALCIUM 9.0 08/15/2020    MG 1.4 (L) 08/15/2020    PHOS 2.0 (L) 08/15/2020     LFTs:   Lab Results   Component Value Date    PROT 7.9 08/15/2020    ALBUMIN 3.5 08/15/2020    BILITOT 0.2 08/15/2020    AST 14 08/15/2020    ALKPHOS 88 08/15/2020    ALT 12 08/15/2020     Coags: No results found for: INR, PROTIME, PTT  FLP:   Lab Results   Component Value Date    CHOL 194 12/12/2014    HDL 49 12/12/2014    LDLCALC 130.6 12/12/2014    TRIG 72 12/12/2014    CHOLHDL 25.3 12/12/2014     DM:   Lab Results   Component Value Date    HGBA1C 10.6 (H) 08/13/2020    LDLCALC 130.6 12/12/2014    CREATININE 1.6 (H) 08/15/2020     Thyroid:   Lab Results   Component Value Date    TSH 1.984 08/13/2020     Anemia: No results found for: IRON, TIBC, FERRITIN, [id number], FOLATE  ?  ?  ASSESSMENT / PLAN:  Patient is a 45 y.o. male with PMH as mentioned above here for CRISTOFER   CRISTOFER - Multifactorial. Unknown baseline last labs 2014 cr 0.8 HTN and Cocaine user    -- Seems to be sever volume depletion - improved after  IVF Cr today 1.6 from 14 on admission   -- Making Urine   -- Will get work up   -      less likely cause of CRISTOFER   -     Hemoglobin A1c; 10.6  -     US Doppler Abd/Retroperitoneal Limited; - no obstruction     -- Please avoid nephrotoxins, including NSAIDs, aminoglycosides, IV contrast (unless absolutely necessary), gadolinium, fleets and other phosphorous-based laxatives. Caution with antibiotics.  -- Daily Renal Function Panel  -- Avoid Hypotension.  -- Renally dose all meds    Hyponatremia - resolved   -- Na+ 125  Improved 132   -- Seems volume depleted s/p 2L of NS     Hyperkalemia - resolved   -- 7.2 ECG changes   -- S/P medical management per ED protocol   -- Improved after IVF     Acidosis AGMA - renal failure + metformin   -- Resolved      Thank you for consult, will follow  With any question please call [phone]  Richard Mcnamara MD    [facility]  ADOLFO Estrada MD, MARK WASHINGTON MD,   LETTY Nieves,  MD DENAE Maurice, NP    [address]  [place], [address]  [phone]

## 2020-08-15 NOTE — ASSESSMENT & PLAN NOTE
Hyperkalemia  Hypotension  CRISTOFER likely due to diarrhea  K shifted in the ED  Avoid nephrotoxic meds  Renally dose meds  Kidney USS  strict input/output- diuresing   Consult nephrology- appreciates rec's

## 2020-08-15 NOTE — PLAN OF CARE
Problem: Adult Inpatient Plan of Care  Goal: Plan of Care Review  Outcome: Ongoing, Progressing  Plan of care and labs reviewed  Plan care updated and reviewed

## 2020-08-15 NOTE — DISCHARGE SUMMARY
[facility] Hospital Medicine  Discharge Summary      Patient Name: Wallace Gonzalez  MRN: [id number]  Admission Date: 8/13/2020  Hospital Length of Stay: 2 days  Discharge Date and Time: 8/15/2020 11:58 AM  Attending Physician: Rohini Wright*   Discharging Provider: Rohini Wright MD  Primary Care Provider: Roberto Carlos Nava Jr, MD      HPI:   Patient is a 46 y/o male with a PMHx of Bronchitis, DM, HTN, HLD, chronic Pancreatitis, presents top ED with 3 days history od 1-2 episode of daily diarrhea with associated abdominal pain, generalized weakness, dizziness, light-headedness. He denies fever, chills, urinary symptoms, nausea or vomiting. In the ED patient labs with elevated Cr and potassium    * No surgery found *      Hospital Course:   No notes on file     Consults:   Consults (From admission, onward)        Status Ordering Provider     Inpatient consult to Nephrology  Once     Provider:  Richard Mcnamara MD    Completed SAMANTHA LOVELACE     Inpatient consult to [facility]  Once     Provider:  Richard Mcnamara MD    Acknowledged GUERRERO, ROHINI VAUGHAN          * Acute renal failure  Hyperkalemia  Hypotension  CRISTOFER likely due to diarrhea  K shifted in the ED  Avoid nephrotoxic meds  Renally dose meds  Kidney USS  strict input/output- diuresing   Consult nephrology- appreciates rec's         Essential hypertension  Hold Lisinopril, Metoprolol, Clonidine due to hypotension  Restart at discharge      Abdominal pain  Diarrhea  Pain med prn      Type 2 diabetes mellitus  Hold Metformin- restart at discharge  Levemir  Low dose SSI  Renal . Diabetic diet  Accucheck      Hyponatremia  IVF  Nephrology on board      Asthma  stable        Final Active Diagnoses:    Diagnosis Date Noted POA    PRINCIPAL PROBLEM:  Acute renal failure [N17.9] 08/13/2020 Yes    Hyponatremia [E87.1] 08/13/2020 Yes    Hyperkalemia [E87.5] 08/13/2020 Yes    Type 2  diabetes mellitus [E11.9] 08/13/2020 Yes    Abdominal pain [R10.9] 08/13/2020 Yes    Diarrhea [R19.7] 08/13/2020 Yes    Essential hypertension [I10] 08/13/2020 Yes    Hypotension [I95.9] 08/13/2020 Yes    Asthma [J45.909] 12/12/2014 Yes      Problems Resolved During this Admission:       Discharged Condition: stable    Disposition: Home or Self Care    Follow Up:  Follow-up Information     Roberto Carlos Nava Jr, MD In 1 week.    Specialty: Internal Medicine  Contact information:  [address]  [place] [address]  [phone]             Richard Mcnamara MD In 2 weeks.    Specialty: Nephrology  Contact information:  [address]  [place] [id number] [phone]                 Patient Instructions:      Diet diabetic     Diet Cardiac     Activity as tolerated       Significant Diagnostic Studies:     Pending Diagnostic Studies:     Procedure Component Value Units Date/Time    Gabapentin level [[id number]] Collected: 08/13/20 1515    Order Status: Sent Lab Status: In process Updated: 08/13/20 1944    Specimen: Blood     US Doppler Abd/Retroperitoneal Limited [[id number]]     Order Status: Sent Lab Status: No result          Medications:  Reconciled Home Medications:      Medication List      START taking these medications    magnesium oxide 400 mg (241.3 mg magnesium) tablet  Commonly known as: MAG-OX  Take 1 tablet (400 mg total) by mouth 2 (two) times daily.        CHANGE how you take these medications    BASAGLAR KWIKPEN U-100 INSULIN glargine 100 units/mL (3mL) SubQ pen  Generic drug: insulin  Inject 25 Units into the skin every evening.  What changed:   · medication strength  · how much to take        CONTINUE taking these medications    albuterol 90 mcg/actuation inhaler  Commonly known as: PROVENTIL/VENTOLIN HFA  Inhale 2 puffs into the lungs every 6 (six) hours as needed for Wheezing.     amitriptyline 50 MG tablet  Commonly known as: ELAVIL  Take 50 mg by mouth every evening.     cloNIDine  0.1 MG tablet  Commonly known as: CATAPRES     CREON Cpdr  Generic drug: lipase-protease-amylase 12,000-38,000-60,000 units  Take by mouth.     gabapentin 600 MG tablet  Commonly known as: NEURONTIN  Take 600 mg by mouth 3 (three) times daily.     HYDROcodone-acetaminophen  mg per tablet  Commonly known as: NORCO     lisinopriL 5 MG tablet  Commonly known as: PRINIVIL,ZESTRIL  Take 5 mg by mouth once daily.     metFORMIN 1000 MG tablet  Commonly known as: GLUCOPHAGE  Take 1,000 mg by mouth daily with breakfast.     metoprolol succinate 50 MG 24 hr tablet  Commonly known as: TOPROL-XL     * omeprazole 20 MG capsule  Commonly known as: PRILOSEC  Take 1 capsule (20 mg total) by mouth once daily.     * omeprazole 40 MG capsule  Commonly known as: PRILOSEC     ondansetron 4 MG Tbdl  Commonly known as: ZOFRAN-ODT  Take 8 mg by mouth every 8 (eight) hours.         * This list has 2 medication(s) that are the same as other medications prescribed for you. Read the directions carefully, and ask your doctor or other care provider to review them with you.            STOP taking these medications    meloxicam 15 MG tablet  Commonly known as: MOBIC            Indwelling Lines/Drains at time of discharge:   Lines/Drains/Airways     None                 Time spent on the discharge of patient: 35 minutes  Patient was seen and examined on the date of discharge and determined to be suitable for discharge.         Annabel Wright MD  Department of Hospital Medicine  [facility]

## 2020-08-15 NOTE — SUBJECTIVE & OBJECTIVE
Interval History: awake and alert, no new complaint, diuresing and renal function improving, d/c IVF  Hyponatremia resolved.           Review of Systems   Constitutional: Negative for chills and fever.   Respiratory: Negative for chest tightness and shortness of breath.    Cardiovascular: Negative for chest pain and palpitations.   Gastrointestinal: Negative for abdominal distention, abdominal pain, blood in stool, diarrhea, nausea and vomiting.   Genitourinary: Negative for dysuria and flank pain.   Musculoskeletal: Negative for arthralgias and myalgias.   Skin: Negative for color change and rash.   Neurological: Negative for dizziness, weakness and headaches.   Psychiatric/Behavioral: Negative for agitation.     Objective:     Vital Signs (Most Recent):  Temp: 97.9 °F (36.6 °C) (08/15/20 0821)  Pulse: 85 (08/15/20 0821)  Resp: 17 (08/15/20 0821)  BP: 133/60 (08/15/20 0821)  SpO2: 100 % (08/15/20 0821) Vital Signs (24h Range):  Temp:  [97.9 °F (36.6 °C)-98.6 °F (37 °C)] 97.9 °F (36.6 °C)  Pulse:  [79-98] 85  Resp:  [17-20] 17  SpO2:  [100 %] 100 %  BP: (116-142)/(60-78) 133/60     Weight: 100.1 kg (220 lb 10.9 oz)  Body mass index is 27.95 kg/m².    Intake/Output Summary (Last 24 hours) at 8/15/2020 0849  Last data filed at 8/15/2020 0700  Gross per 24 hour   Intake 2156.25 ml   Output 3225 ml   Net -1068.75 ml      Physical Exam  Constitutional:       Appearance: He is well-developed.   HENT:      Head: Normocephalic and atraumatic.   Neck:      Musculoskeletal: Normal range of motion and neck supple.   Cardiovascular:      Rate and Rhythm: Normal rate and regular rhythm.      Heart sounds: Normal heart sounds. No murmur. No friction rub. No gallop.    Pulmonary:      Effort: Pulmonary effort is normal.      Breath sounds: Normal breath sounds.   Chest:      Chest wall: No tenderness.   Abdominal:      General: Bowel sounds are normal. There is no distension.      Palpations: Abdomen is soft.      Tenderness:  There is no abdominal tenderness.   Musculoskeletal: Normal range of motion.   Skin:     General: Skin is warm.      Capillary Refill: Capillary refill takes less than 2 seconds.   Neurological:      Mental Status: He is alert and oriented to person, place, and time.   Psychiatric:         Mood and Affect: Mood normal.         Significant Labs:   A1C:   Recent Labs   Lab 08/13/20  1515   HGBA1C 10.6*     CBC:   Recent Labs   Lab 08/13/20  1047 08/14/20 0423 08/15/20  0533   WBC 9.07 4.54 4.68   HGB 11.5* 9.5* 10.3*   HCT 34.3* 29.2* 32.1*    224 230     CMP:   Recent Labs   Lab 08/13/20  1208  08/13/20  2003 08/14/20  0423 08/15/20  0533   *   < > 130* 132* 138   K 7.2*   < > 5.1 4.4 4.5   CL 94*   < > 98 100 105   CO2 13*   < > 16* 19* 23   *   < > 173* 214* 127*   BUN 87*   < > 83* 75* 33*   CREATININE 13.2*   < > 10.6* 7.1* 1.6*   CALCIUM 8.2*   < > 8.0* 8.2* 9.0   PROT 8.6*  --   --  7.6 7.9   ALBUMIN 4.2   < > 3.8 3.5 3.5   BILITOT 0.3  --   --  0.2 0.2   ALKPHOS 112  --   --  104 88   AST 18  --   --  14 14   ALT 19  --   --  15 12   ANIONGAP 18*   < > 16 13 10   EGFRNONAA 4*   < > 5* 9* 52*    < > = values in this interval not displayed.     Lactic Acid:   Recent Labs   Lab 08/13/20  1515   LACTATE 1.6     Lipase:   Recent Labs   Lab 08/13/20  1047   LIPASE 39     Lipid Panel: No results for input(s): CHOL, HDL, LDLCALC, TRIG, CHOLHDL in the last 48 hours.  Troponin:   Recent Labs   Lab 08/13/20  1047   TROPONINI <0.006     TSH:   Recent Labs   Lab 08/13/20  1047   TSH 1.984     Urine Culture: No results for input(s): LABURIN in the last 48 hours.  Urine Studies:   Recent Labs   Lab 08/13/20  1511   COLORU Yellow  Yellow   APPEARANCEUA Clear  Clear   PHUR 6.0  6.0   SPECGRAV >=1.030*  >=1.030*   PROTEINUA 1+*  1+*   GLUCUA Negative  Negative   KETONESU Negative  Negative   BILIRUBINUA Negative  Negative   OCCULTUA 1+*  1+*   NITRITE Negative  Negative   UROBILINOGEN Negative   Negative   LEUKOCYTESUR Negative  Negative   RBCUA 5*   WBCUA 3   BACTERIA Few*   HYALINECASTS 0       Significant Imaging: I have reviewed all pertinent imaging results/findings within the past 24 hours.

## 2020-08-15 NOTE — PROGRESS NOTES
[facility] Hospital Medicine  Progress Note    Patient Name: Wallace Gonzalez  MRN: [id number]  Patient Class: IP- Inpatient   Admission Date: 8/13/2020  Length of Stay: 2 days  Attending Physician: Annabel Wright*  Primary Care Provider: Roberto Carlos Nava Jr, MD        Subjective:     Principal Problem:Acute renal failure        HPI:  Patient is a 46 y/o male with a PMHx of Bronchitis, DM, HTN, HLD, chronic Pancreatitis, presents top ED with 3 days history od 1-2 episode of daily diarrhea with associated abdominal pain, generalized weakness, dizziness, light-headedness. He denies fever, chills, urinary symptoms, nausea or vomiting. In the ED patient labs with elevated Cr and potassium    Overview/Hospital Course:  No notes on file    Interval History: awake and alert, no new complaint, diuresing and renal function improving, d/c IVF  Hyponatremia resolved.           Review of Systems   Constitutional: Negative for chills and fever.   Respiratory: Negative for chest tightness and shortness of breath.    Cardiovascular: Negative for chest pain and palpitations.   Gastrointestinal: Negative for abdominal distention, abdominal pain, blood in stool, diarrhea, nausea and vomiting.   Genitourinary: Negative for dysuria and flank pain.   Musculoskeletal: Negative for arthralgias and myalgias.   Skin: Negative for color change and rash.   Neurological: Negative for dizziness, weakness and headaches.   Psychiatric/Behavioral: Negative for agitation.     Objective:     Vital Signs (Most Recent):  Temp: 97.9 °F (36.6 °C) (08/15/20 0821)  Pulse: 85 (08/15/20 0821)  Resp: 17 (08/15/20 0821)  BP: 133/60 (08/15/20 0821)  SpO2: 100 % (08/15/20 0821) Vital Signs (24h Range):  Temp:  [97.9 °F (36.6 °C)-98.6 °F (37 °C)] 97.9 °F (36.6 °C)  Pulse:  [79-98] 85  Resp:  [17-20] 17  SpO2:  [100 %] 100 %  BP: (116-142)/(60-78) 133/60     Weight: 100.1 kg (220 lb 10.9 oz)  Body mass index is 27.95  kg/m².    Intake/Output Summary (Last 24 hours) at 8/15/2020 0849  Last data filed at 8/15/2020 0700  Gross per 24 hour   Intake 2156.25 ml   Output 3225 ml   Net -1068.75 ml      Physical Exam  Constitutional:       Appearance: He is well-developed.   HENT:      Head: Normocephalic and atraumatic.   Neck:      Musculoskeletal: Normal range of motion and neck supple.   Cardiovascular:      Rate and Rhythm: Normal rate and regular rhythm.      Heart sounds: Normal heart sounds. No murmur. No friction rub. No gallop.    Pulmonary:      Effort: Pulmonary effort is normal.      Breath sounds: Normal breath sounds.   Chest:      Chest wall: No tenderness.   Abdominal:      General: Bowel sounds are normal. There is no distension.      Palpations: Abdomen is soft.      Tenderness: There is no abdominal tenderness.   Musculoskeletal: Normal range of motion.   Skin:     General: Skin is warm.      Capillary Refill: Capillary refill takes less than 2 seconds.   Neurological:      Mental Status: He is alert and oriented to person, place, and time.   Psychiatric:         Mood and Affect: Mood normal.         Significant Labs:   A1C:   Recent Labs   Lab 08/13/20  1515   HGBA1C 10.6*     CBC:   Recent Labs   Lab 08/13/20  1047 08/14/20  0423 08/15/20  0533   WBC 9.07 4.54 4.68   HGB 11.5* 9.5* 10.3*   HCT 34.3* 29.2* 32.1*    224 230     CMP:   Recent Labs   Lab 08/13/20  1208  08/13/20  2003 08/14/20  0423 08/15/20  0533   *   < > 130* 132* 138   K 7.2*   < > 5.1 4.4 4.5   CL 94*   < > 98 100 105   CO2 13*   < > 16* 19* 23   *   < > 173* 214* 127*   BUN 87*   < > 83* 75* 33*   CREATININE 13.2*   < > 10.6* 7.1* 1.6*   CALCIUM 8.2*   < > 8.0* 8.2* 9.0   PROT 8.6*  --   --  7.6 7.9   ALBUMIN 4.2   < > 3.8 3.5 3.5   BILITOT 0.3  --   --  0.2 0.2   ALKPHOS 112  --   --  104 88   AST 18  --   --  14 14   ALT 19  --   --  15 12   ANIONGAP 18*   < > 16 13 10   EGFRNONAA 4*   < > 5* 9* 52*    < > = values in this  interval not displayed.     Lactic Acid:   Recent Labs   Lab 08/13/20  1515   LACTATE 1.6     Lipase:   Recent Labs   Lab 08/13/20  1047   LIPASE 39     Lipid Panel: No results for input(s): CHOL, HDL, LDLCALC, TRIG, CHOLHDL in the last 48 hours.  Troponin:   Recent Labs   Lab 08/13/20  1047   TROPONINI <0.006     TSH:   Recent Labs   Lab 08/13/20  1047   TSH 1.984     Urine Culture: No results for input(s): LABURIN in the last 48 hours.  Urine Studies:   Recent Labs   Lab 08/13/20  1511   COLORU Yellow  Yellow   APPEARANCEUA Clear  Clear   PHUR 6.0  6.0   SPECGRAV >=1.030*  >=1.030*   PROTEINUA 1+*  1+*   GLUCUA Negative  Negative   KETONESU Negative  Negative   BILIRUBINUA Negative  Negative   OCCULTUA 1+*  1+*   NITRITE Negative  Negative   UROBILINOGEN Negative  Negative   LEUKOCYTESUR Negative  Negative   RBCUA 5*   WBCUA 3   BACTERIA Few*   HYALINECASTS 0       Significant Imaging: I have reviewed all pertinent imaging results/findings within the past 24 hours.      Assessment/Plan:      * Acute renal failure  Hyperkalemia  Hypotension  CRISTOFER likely due to diarrhea  K shifted in the ED  Avoid nephrotoxic meds  Renally dose meds  Kidney USS  strict input/output- diuresing   Consult nephrology- appreciates rec's         Essential hypertension  Hold Lisinopril, Metoprolol, Clonidine due to hypotension      Abdominal pain  Diarrhea  Pain med prn      Type 2 diabetes mellitus  Hold Metformin  Levemir  Low dose SSI  NPO  Accucheck      Hyponatremia  IVF  Nephrology on board      Asthma  stable      Pancreatitis, acute          VTE Risk Mitigation (From admission, onward)         Ordered     heparin (porcine) injection 5,000 Units  Every 8 hours      08/13/20 1733     IP VTE LOW RISK PATIENT  Once      08/13/20 1733                Discharge Planning   URIEL:      Code Status: Full Code   Is the patient medically ready for discharge?:     Reason for patient still in hospital (select all that apply): Patient  trending condition  Discharge Plan A: Home with family                  Annabel N MD Adriana  Department of Hospital Medicine   [facility]

## 2020-08-15 NOTE — PLAN OF CARE
VN cued into room to review discharge instructions.  Went over doctor specific instructions, new medications, where to , follow up appointments, when to call the doctor.  All questions answered.  Informed pt of survey.  Pt waiting on ride to arrive.

## 2020-08-15 NOTE — PLAN OF CARE
Discharge orders noted, no HH or HME ordered.    Pt's nurse will go over medications/signs and symptoms prior to discharge       08/15/20 1020   Final Note   Assessment Type Final Discharge Note   Anticipated Discharge Disposition Home   What phone number can be called within the next 1-3 days to see how you are doing after discharge? [phone]   Hospital Follow Up  Appt(s) scheduled? No  (Offices closed for weekend. Patient to schedule own follow up appointment.)   Right [facility] Referral Info   Post Acute Recommendation No Care     Lavinia Sherman RN Transitional Navigator  [phone]

## 2020-08-17 ENCOUNTER — PATIENT OUTREACH (OUTPATIENT)
Dept: ADMINISTRATIVE | Facility: CLINIC | Age: 45
End: 2020-08-17

## 2020-08-18 LAB — GABAPENTIN SERPLBLD-MCNC: 68.6 MCG/ML (ref 2–20)

## 2021-05-11 NOTE — PROGRESS NOTES
Nephrology Progress  Note    ?  ?  CONSULTING PHYSICIAN:  Attending Physician: Annabel Wright*  Reason for Consult:   Nephrology consulted for CRISTOFER management.  CHIEF COMPLAINT :Hypotension (pt has been feeling weak and has had poor vision for the past few days. also has been vomiting after eating anything for the past 4 days. was hypotensive in Dr. Tena's office pta, so was sent to the ER.)    Subjective:   Interval Hx: doing well on IVF Cr trending down.       History of Present Illness:  Wallace Gonzalez is a 45 y.o.   male who  has a past medical history of Bronchitis, Diabetic peripheral neuropathy, DM2 (diabetes mellitus, type 2), HTN (hypertension), Hypercholesterolemia, Pancreatic duct stones, Pancreatic lesion, Pancreatitis, and Type 2 diabetes mellitus (8/13/2020).. The patient presented to the [facility] on 8/13/2020 with a primary complaint of weakness hypotension from Dr Tena's clinic. In ER found to have sever renal failure, with multiple electrolytes derangement, acidosis. Patient reports taking 1000 Metformin TID, 800 Gabapentin TID, Lisinopril, Denies NSAIDs use, admits Cocaine use last use 5-6 month ago. Denies chest pain, SOB, no Hx of CAD. Denies EtOH use.     ?  ?  ?  ?  Past Medical History:  Past Medical History:   Diagnosis Date    Bronchitis     Diabetic peripheral neuropathy     DM2 (diabetes mellitus, type 2)     HTN (hypertension)     Hypercholesterolemia     Pancreatic duct stones     Pancreatic lesion     Pancreatitis     Type 2 diabetes mellitus 8/13/2020       Allergies:  Review of patient's allergies indicates:  No Known Allergies  ?  Medications:  Home Medications:    Current Facility-Administered Medications:     dextrose 50% injection 12.5 g, 12.5 g, Intravenous, PRN, Annabel Wright MD    dextrose 50% injection 25 g, 25 g, Intravenous, PRN, Annabel Wright MD    glucagon (human recombinant) injection 1 mg, 1 mg, Intramuscular, PRN,  Annabel Wright MD    glucose chewable tablet 16 g, 16 g, Oral, PRN, Annabel Wright MD    glucose chewable tablet 24 g, 24 g, Oral, PRN, Annabel Wright MD    heparin (porcine) injection 5,000 Units, 5,000 Units, Subcutaneous, Q8H, Annabel Wright MD, 5,000 Units at 08/14/20 0510    HYDROcodone-acetaminophen 5-325 mg per tablet 1 tablet, 1 tablet, Oral, Q6H PRN, Annabel Wright MD    insulin aspart U-100 pen 1-10 Units, 1-10 Units, Subcutaneous, QID (AC + HS) PRN, Annabel Wright MD, 2 Units at 08/14/20 0511    insulin detemir U-100 pen 10 Units, 10 Units, Subcutaneous, QHS, Annabel Wright MD, 10 Units at 08/13/20 2103    lactated ringers infusion, , Intravenous, Continuous, Richard Mcnamara MD, Last Rate: 125 mL/hr at 08/14/20 1033    magnesium oxide tablet 400 mg, 400 mg, Oral, BID, Annabel Wright MD, 400 mg at 08/14/20 0812    melatonin tablet 6 mg, 6 mg, Oral, Nightly PRN, Annabel Wright MD    ondansetron injection 4 mg, 4 mg, Intravenous, Q8H PRN, Annabel Wright MD    sodium chloride 0.9% flush 10 mL, 10 mL, Intravenous, PRN, Annabel Wright MD  Inpatient Meds: Scheduled Meds:  Continuous Infusions:  PRN Meds:.  Review of Systems:  Review of Systems   Constitutional: Negative for chills and fever.   HENT: Negative for congestion and sore throat.    Eyes: Negative for blurred vision, double vision and photophobia.   Respiratory: Negative for cough and shortness of breath.    Cardiovascular: Negative for chest pain, palpitations and leg swelling.   Gastrointestinal: Negative for abdominal pain, diarrhea, nausea and vomiting.   Genitourinary: Negative for dysuria and urgency.   Musculoskeletal: Negative for joint pain and myalgias.   Skin: Negative for itching and rash.   Neurological: Negative for dizziness, sensory change, loss of consciousness, weakness and headaches.    Endo/Heme/Allergies: Negative for polydipsia. Does not bruise/bleed easily.   Psychiatric/Behavioral: Negative for depression.     ?    Objective:     Vitals:    08/14/20 0835   BP: 125/67   Pulse: 94   Resp: 18   Temp: 98.2 °F (36.8 °C)       PHYSICALEXAMINATION  Physical Exam  Vitals signs and nursing note reviewed.   Constitutional:       General: He is not in acute distress.     Appearance: He is well-developed. He is not diaphoretic.   HENT:      Head: Normocephalic and atraumatic.      Mouth/Throat:      Mouth: Mucous membranes are dry.      Pharynx: No oropharyngeal exudate.   Eyes:      General: No scleral icterus.     Pupils: Pupils are equal, round, and reactive to light.   Neck:      Musculoskeletal: Normal range of motion and neck supple.      Trachea: No tracheal deviation.   Cardiovascular:      Rate and Rhythm: Regular rhythm.      Heart sounds: Normal heart sounds. No murmur.   Pulmonary:      Effort: Pulmonary effort is normal.      Breath sounds: Normal breath sounds.   Abdominal:      General: Bowel sounds are normal. There is no distension.      Palpations: Abdomen is soft.      Tenderness: There is no abdominal tenderness.   Musculoskeletal: Normal range of motion.         General: No deformity.   Skin:     General: Skin is warm and dry.      Findings: No erythema or rash.   Neurological:      Mental Status: He is alert and oriented to person, place, and time.      Cranial Nerves: No cranial nerve deficit.   Psychiatric:         Behavior: Behavior normal.         Thought Content: Thought content normal.       Laboratory Results:  Most Recent Data:  CBC:   Lab Results   Component Value Date    WBC 4.54 08/14/2020    HGB 9.5 (L) 08/14/2020    HCT 29.2 (L) 08/14/2020     08/14/2020    MCV 86 08/14/2020    RDW 14.8 (H) 08/14/2020     BMP:   Lab Results   Component Value Date     (L) 08/14/2020    K 4.4 08/14/2020     08/14/2020    CO2 19 (L) 08/14/2020    BUN 75 (H) 08/14/2020      (H) 08/14/2020    CALCIUM 8.2 (L) 08/14/2020    MG 1.5 (L) 08/14/2020    PHOS 4.3 08/14/2020     LFTs:   Lab Results   Component Value Date    PROT 7.6 08/14/2020    ALBUMIN 3.5 08/14/2020    BILITOT 0.2 08/14/2020    AST 14 08/14/2020    ALKPHOS 104 08/14/2020    ALT 15 08/14/2020     Coags: No results found for: INR, PROTIME, PTT  FLP:   Lab Results   Component Value Date    CHOL 194 12/12/2014    HDL 49 12/12/2014    LDLCALC 130.6 12/12/2014    TRIG 72 12/12/2014    CHOLHDL 25.3 12/12/2014     DM:   Lab Results   Component Value Date    HGBA1C 10.6 (H) 08/13/2020    LDLCALC 130.6 12/12/2014    CREATININE 7.1 (H) 08/14/2020     Thyroid:   Lab Results   Component Value Date    TSH 1.984 08/13/2020     Anemia: No results found for: IRON, TIBC, FERRITIN, [id number], FOLATE  ?  ?  ASSESSMENT / PLAN:  Patient is a 45 y.o. male with PMH as mentioned above here for CRISTOFER   CRISTOFER - Multifactorial. Unknown baseline last labs 2014 cr 0.8 HTN and Cocaine user    -- Seems to be sever volume depletion - improving on IVF   -- Making Urine   -- Will get work up   -      less likely cause of CRISTOFER   -     Hemoglobin A1c; 10.6  -     US Doppler Abd/Retroperitoneal Limited; - no obstruction     -- Please avoid nephrotoxins, including NSAIDs, aminoglycosides, IV contrast (unless absolutely necessary), gadolinium, fleets and other phosphorous-based laxatives. Caution with antibiotics.  -- no need for RRT  High K at range acidosis improving no uremia symptoms   -- Daily Renal Function Panel  -- Avoid Hypotension.  -- Renally dose all meds    Hyponatremia   -- Na+ 125  Improved 132   -- Seems volume depleted s/p 2L of NS     Hyperkalemia   -- 7.2 ECG changes   -- S/P medical management per ED protocol   -- Improved after IVF     Acidosis AGMA - renal failure + metformin   -- Improving   -- Continue IVF LR     Thank you for consult, will follow  With any question please call [phone]  Richard Mcnamara MD    [facility]  ADOLFO Estrada MD, FACGABBY,   MARK Griffith MD,   MD TARIK Yoo MD E. V. Harmon, NP    [address]  [place], [address] [phone]     negative

## 2021-06-11 ENCOUNTER — TELEPHONE (OUTPATIENT)
Dept: SURGERY | Facility: CLINIC | Age: 46
End: 2021-06-11

## 2021-06-16 ENCOUNTER — TELEPHONE (OUTPATIENT)
Dept: SURGERY | Facility: CLINIC | Age: 46
End: 2021-06-16

## 2021-07-08 ENCOUNTER — OFFICE VISIT (OUTPATIENT)
Dept: GASTROENTEROLOGY | Facility: CLINIC | Age: 46
End: 2021-07-08
Payer: MEDICARE

## 2021-07-08 ENCOUNTER — TELEPHONE (OUTPATIENT)
Dept: GASTROENTEROLOGY | Facility: CLINIC | Age: 46
End: 2021-07-08

## 2021-07-08 VITALS
BODY MASS INDEX: 29.9 KG/M2 | WEIGHT: 233 LBS | HEART RATE: 108 BPM | SYSTOLIC BLOOD PRESSURE: 123 MMHG | DIASTOLIC BLOOD PRESSURE: 75 MMHG | HEIGHT: 74 IN

## 2021-07-08 DIAGNOSIS — K86.2 PANCREAS CYST: Primary | ICD-10-CM

## 2021-07-08 PROCEDURE — 99999 PR PBB SHADOW E&M-EST. PATIENT-LVL III: CPT | Mod: PBBFAC,,, | Performed by: INTERNAL MEDICINE

## 2021-07-08 PROCEDURE — 99999 PR PBB SHADOW E&M-EST. PATIENT-LVL III: ICD-10-PCS | Mod: PBBFAC,,, | Performed by: INTERNAL MEDICINE

## 2021-07-08 PROCEDURE — 3008F PR BODY MASS INDEX (BMI) DOCUMENTED: ICD-10-PCS | Mod: CPTII,S$GLB,, | Performed by: INTERNAL MEDICINE

## 2021-07-08 PROCEDURE — 1126F AMNT PAIN NOTED NONE PRSNT: CPT | Mod: S$GLB,,, | Performed by: INTERNAL MEDICINE

## 2021-07-08 PROCEDURE — 3008F BODY MASS INDEX DOCD: CPT | Mod: CPTII,S$GLB,, | Performed by: INTERNAL MEDICINE

## 2021-07-08 PROCEDURE — 99213 OFFICE O/P EST LOW 20 MIN: CPT | Mod: S$GLB,,, | Performed by: INTERNAL MEDICINE

## 2021-07-08 PROCEDURE — 99213 PR OFFICE/OUTPT VISIT, EST, LEVL III, 20-29 MIN: ICD-10-PCS | Mod: S$GLB,,, | Performed by: INTERNAL MEDICINE

## 2021-07-08 PROCEDURE — 1126F PR PAIN SEVERITY QUANTIFIED, NO PAIN PRESENT: ICD-10-PCS | Mod: S$GLB,,, | Performed by: INTERNAL MEDICINE

## 2021-07-14 ENCOUNTER — TELEPHONE (OUTPATIENT)
Dept: ENDOSCOPY | Facility: HOSPITAL | Age: 46
End: 2021-07-14

## 2021-10-21 ENCOUNTER — TELEPHONE (OUTPATIENT)
Dept: ENDOSCOPY | Facility: HOSPITAL | Age: 46
End: 2021-10-21

## 2021-11-11 ENCOUNTER — TELEPHONE (OUTPATIENT)
Dept: ENDOSCOPY | Facility: HOSPITAL | Age: 46
End: 2021-11-11
Payer: MEDICARE

## 2022-02-03 ENCOUNTER — TELEPHONE (OUTPATIENT)
Dept: ENDOSCOPY | Facility: HOSPITAL | Age: 47
End: 2022-02-03
Payer: MEDICAID

## 2022-03-02 ENCOUNTER — TELEPHONE (OUTPATIENT)
Dept: ENDOSCOPY | Facility: HOSPITAL | Age: 47
End: 2022-03-02
Payer: MEDICAID

## 2022-04-11 ENCOUNTER — TELEPHONE (OUTPATIENT)
Dept: ENDOSCOPY | Facility: HOSPITAL | Age: 47
End: 2022-04-11
Payer: MEDICAID

## 2022-04-11 NOTE — TELEPHONE ENCOUNTER
Received request to schedule patient for EUS on 5/6/2022 at 9:00am.  Spoke with patient.  Pt is fully vaccinated.  Reviewed medical history and medications.  Instructed on procedure and prep.  Patient verbalized understanding of instructions.  Copy of instructions mailed to address on file.

## 2022-04-18 ENCOUNTER — TELEPHONE (OUTPATIENT)
Dept: ENDOSCOPY | Facility: HOSPITAL | Age: 47
End: 2022-04-18
Payer: MEDICAID

## 2022-04-18 NOTE — TELEPHONE ENCOUNTER
Received message to call pt.  Spoke with pt and he states he lost his instructions.  New copy of instructions mailed to address pt provided-[address], [place] [address].

## 2022-05-06 ENCOUNTER — ANESTHESIA (OUTPATIENT)
Dept: ENDOSCOPY | Facility: HOSPITAL | Age: 47
End: 2022-05-06
Payer: MEDICARE

## 2022-05-06 ENCOUNTER — ANESTHESIA EVENT (OUTPATIENT)
Dept: ENDOSCOPY | Facility: HOSPITAL | Age: 47
End: 2022-05-06
Payer: MEDICARE

## 2022-05-06 ENCOUNTER — HOSPITAL ENCOUNTER (OUTPATIENT)
Facility: HOSPITAL | Age: 47
Discharge: HOME | End: 2022-05-06
Attending: INTERNAL MEDICINE | Admitting: INTERNAL MEDICINE
Payer: MEDICARE

## 2022-05-06 VITALS
TEMPERATURE: 98 F | DIASTOLIC BLOOD PRESSURE: 73 MMHG | BODY MASS INDEX: 29.65 KG/M2 | HEIGHT: 74 IN | WEIGHT: 231 LBS | SYSTOLIC BLOOD PRESSURE: 114 MMHG | OXYGEN SATURATION: 97 % | RESPIRATION RATE: 18 BRPM | HEART RATE: 74 BPM

## 2022-05-06 DIAGNOSIS — K86.1 CHRONIC PANCREATITIS, UNSPECIFIED PANCREATITIS TYPE: Primary | ICD-10-CM

## 2022-05-06 DIAGNOSIS — K86.9 LESION OF PANCREAS: ICD-10-CM

## 2022-05-06 LAB
GLUCOSE SERPL-MCNC: 214 MG/DL (ref 70–110)
POCT GLUCOSE: 153 MG/DL (ref 70–110)

## 2022-05-06 PROCEDURE — 25000003 PHARM REV CODE 250: Performed by: NURSE ANESTHETIST, CERTIFIED REGISTERED

## 2022-05-06 PROCEDURE — 82962 GLUCOSE BLOOD TEST: CPT | Performed by: INTERNAL MEDICINE

## 2022-05-06 PROCEDURE — 43259 EGD US EXAM DUODENUM/JEJUNUM: CPT | Mod: ,,, | Performed by: INTERNAL MEDICINE

## 2022-05-06 PROCEDURE — D9220A PRA ANESTHESIA: ICD-10-PCS | Mod: ANES,,, | Performed by: ANESTHESIOLOGY

## 2022-05-06 PROCEDURE — 63600175 PHARM REV CODE 636 W HCPCS: Performed by: NURSE ANESTHETIST, CERTIFIED REGISTERED

## 2022-05-06 PROCEDURE — 00731 ANES UPR GI NDSC PX NOS: CPT | Performed by: INTERNAL MEDICINE

## 2022-05-06 PROCEDURE — D9220A PRA ANESTHESIA: ICD-10-PCS | Mod: CRNA,,, | Performed by: NURSE ANESTHETIST, CERTIFIED REGISTERED

## 2022-05-06 PROCEDURE — 43259 EGD US EXAM DUODENUM/JEJUNUM: CPT | Performed by: INTERNAL MEDICINE

## 2022-05-06 PROCEDURE — D9220A PRA ANESTHESIA: Mod: ANES,,, | Performed by: ANESTHESIOLOGY

## 2022-05-06 PROCEDURE — 25000003 PHARM REV CODE 250: Performed by: INTERNAL MEDICINE

## 2022-05-06 PROCEDURE — 43259 PR ENDOSCOPIC ULTRASOUND EXAM: ICD-10-PCS | Mod: ,,, | Performed by: INTERNAL MEDICINE

## 2022-05-06 PROCEDURE — D9220A PRA ANESTHESIA: Mod: CRNA,,, | Performed by: NURSE ANESTHETIST, CERTIFIED REGISTERED

## 2022-05-06 PROCEDURE — 37000008 HC ANESTHESIA 1ST 15 MINUTES: Performed by: INTERNAL MEDICINE

## 2022-05-06 PROCEDURE — 37000009 HC ANESTHESIA EA ADD 15 MINS: Performed by: INTERNAL MEDICINE

## 2022-05-06 RX ORDER — ONDANSETRON 2 MG/ML
4 INJECTION INTRAMUSCULAR; INTRAVENOUS DAILY PRN
Status: DISCONTINUED | OUTPATIENT
Start: 2022-05-06 | End: 2022-05-06 | Stop reason: HOSPADM

## 2022-05-06 RX ORDER — PROPOFOL 10 MG/ML
VIAL (ML) INTRAVENOUS CONTINUOUS PRN
Status: DISCONTINUED | OUTPATIENT
Start: 2022-05-06 | End: 2022-05-06

## 2022-05-06 RX ORDER — SODIUM CHLORIDE 0.9 % (FLUSH) 0.9 %
10 SYRINGE (ML) INJECTION
Status: DISCONTINUED | OUTPATIENT
Start: 2022-05-06 | End: 2022-05-06 | Stop reason: HOSPADM

## 2022-05-06 RX ORDER — SODIUM CHLORIDE 9 MG/ML
INJECTION, SOLUTION INTRAVENOUS CONTINUOUS
Status: DISCONTINUED | OUTPATIENT
Start: 2022-05-06 | End: 2022-05-06 | Stop reason: HOSPADM

## 2022-05-06 RX ORDER — PROPOFOL 10 MG/ML
VIAL (ML) INTRAVENOUS
Status: DISCONTINUED | OUTPATIENT
Start: 2022-05-06 | End: 2022-05-06

## 2022-05-06 RX ORDER — LIDOCAINE HYDROCHLORIDE 20 MG/ML
INJECTION, SOLUTION EPIDURAL; INFILTRATION; INTRACAUDAL; PERINEURAL
Status: DISCONTINUED | OUTPATIENT
Start: 2022-05-06 | End: 2022-05-06

## 2022-05-06 RX ORDER — PROCHLORPERAZINE EDISYLATE 5 MG/ML
5 INJECTION INTRAMUSCULAR; INTRAVENOUS EVERY 30 MIN PRN
Status: DISCONTINUED | OUTPATIENT
Start: 2022-05-06 | End: 2022-05-06 | Stop reason: HOSPADM

## 2022-05-06 RX ADMIN — SODIUM CHLORIDE: 0.9 INJECTION, SOLUTION INTRAVENOUS at 09:05

## 2022-05-06 RX ADMIN — PROPOFOL 100 MG: 10 INJECTION, EMULSION INTRAVENOUS at 10:05

## 2022-05-06 RX ADMIN — LIDOCAINE HYDROCHLORIDE 50 MG: 20 INJECTION, SOLUTION EPIDURAL; INFILTRATION; INTRACAUDAL at 10:05

## 2022-05-06 RX ADMIN — Medication 200 MCG/KG/MIN: at 10:05

## 2022-05-06 NOTE — ANESTHESIA POSTPROCEDURE EVALUATION
Anesthesia Post Evaluation    Patient: Wallace Gonzalez    Procedure(s) Performed: Procedure(s) (LRB):  ULTRASOUND, UPPER GI TRACT, ENDOSCOPIC (N/A)    Final Anesthesia Type: general      Patient location during evaluation: [facility]  Patient participation: Yes- Able to Participate  Level of consciousness: awake and alert  Post-procedure vital signs: reviewed and stable  Pain management: adequate  Airway patency: patent  NIKITA mitigation strategies: Multimodal analgesia  PONV status at discharge: No PONV  Anesthetic complications: no      Cardiovascular status: stable  Respiratory status: unassisted and spontaneous ventilation  Hydration status: euvolemic  Follow-up not needed.          Vitals Value Taken Time   /73 05/06/22 1131   Temp 36.8 °C (98.2 °F) 05/06/22 1130   Pulse 74 05/06/22 1132   Resp 18 05/06/22 1130   SpO2 96 % 05/06/22 1132   Vitals shown include unvalidated device data.      No case tracking events are documented in the log.      Pain/Roberto Score: Roberto Score: 10 (5/6/2022 11:30 AM)

## 2022-05-06 NOTE — PROVATION PATIENT INSTRUCTIONS
Discharge Summary/Instructions after an Endoscopic Procedure  Patient Name: Wallace Gonzalez  Patient MRN: [id number]  Patient YOB: 1975  Friday, May 6, 2022  Aureliano Lyn MD  Dear patient,  As a result of recent federal legislation (The Federal Cures Act), you may   receive lab or pathology results from your procedure in your [facility]   account before your physician is able to contact you. Your physician or   their representative will relay the results to you with their   recommendations at their soonest availability.  Thank you,  RESTRICTIONS:  During your procedure today, you received medications for sedation.  These   medications may affect your judgment, balance and coordination.  Therefore,   for 24 hours, you have the following restrictions:   - DO NOT drive a car, operate machinery, make legal/financial decisions,   sign important papers or drink alcohol.    ACTIVITY:  Today: no heavy lifting, straining or running due to procedural   sedation/anesthesia.  The following day: return to full activity including work.  DIET:  Eat and drink normally unless instructed otherwise.     TREATMENT FOR COMMON SIDE EFFECTS:  - Mild abdominal pain, nausea, belching, bloating or excessive gas:  rest,   eat lightly and use a heating pad.  - Sore Throat: treat with throat lozenges and/or gargle with warm salt   water.  - Because air was used during the procedure, expelling large amounts of air   from your rectum or belching is normal.  - If a bowel prep was taken, you may not have a bowel movement for 1-3 days.    This is normal.  SYMPTOMS TO WATCH FOR AND REPORT TO YOUR PHYSICIAN:  1. Abdominal pain or bloating, other than gas cramps.  2. Chest pain.  3. Back pain.  4. Signs of infection such as: chills or fever occurring within 24 hours   after the procedure.  5. Rectal bleeding, which would show as bright red, maroon, or black stools.   (A tablespoon of blood from the rectum is not serious, especially if    hemorrhoids are present.)  6. Vomiting.  7. Weakness or dizziness.  GO DIRECTLY TO THE NEAREST EMERGENCY ROOM IF YOU HAVE ANY OF THE FOLLOWING:      Difficulty breathing              Chills and/or fever over 101 F   Persistent vomiting and/or vomiting blood   Severe abdominal pain   Severe chest pain   Black, tarry stools   Bleeding- more than one tablespoon   Any other symptom or condition that you feel may need urgent attention  Your doctor recommends these additional instructions:  If any biopsies were taken, your doctors clinic will contact you in 1 to 2   weeks with any results.  - Discharge patient to home.   - Resume previous diet.   - Continue present medications.   - Return to primary care physician at appointment to be scheduled.  For questions, problems or results please call your physician - Aureliano Lyn MD at Work:  [phone].  [facility], EMERGENCY ROOM PHONE NUMBER: [phone]  IF A COMPLICATION OR EMERGENCY SITUATION ARISES AND YOU ARE UNABLE TO REACH   YOUR PHYSICIAN - GO DIRECTLY TO THE EMERGENCY ROOM.  Aureliano Lyn MD  5/6/2022 10:15:37 AM  This report has been verified and signed electronically.  Dear patient,  As a result of recent federal legislation (The Federal Cures Act), you may   receive lab or pathology results from your procedure in your [facility]   account before your physician is able to contact you. Your physician or   their representative will relay the results to you with their   recommendations at their soonest availability.  Thank you,  PROVATION

## 2022-05-06 NOTE — PLAN OF CARE
Discharge instructions discussed with pt and pt's significant other. Pt verbalizes understanding. MD Riki came to bedside after procedure and spoke with pt and family. Consents in chart, vital signs stable, no complaints.

## 2022-05-06 NOTE — ANESTHESIA PREPROCEDURE EVALUATION
05/06/2022  Wallace Gonzalez is a 47 y.o., male     Patient Active Problem List    Diagnosis Date Noted    Acute renal failure 08/13/2020    Hyponatremia 08/13/2020    Hyperkalemia 08/13/2020    Type 2 diabetes mellitus 08/13/2020    Abdominal pain 08/13/2020    Diarrhea 08/13/2020    Essential hypertension 08/13/2020    Hypotension 08/13/2020    Lesion of pancreas 08/29/2019    Chronic pancreatitis 06/13/2019    Pancreas cyst 12/17/2014    Avascular necrosis of femur head, left 12/12/2014    Avascular necrosis of femur head, right 12/12/2014    Asthma 12/12/2014    Nausea vomiting and diarrhea 12/12/2014    Pancreatitis, acute 12/11/2014     .      Pre-op Assessment    I have reviewed the Patient Summary Reports.     I have reviewed the Nursing Notes. I have reviewed the NPO Status.   I have reviewed the Medications.     Review of Systems  Anesthesia Hx:  No problems with previous Anesthesia    Social:  Non-Smoker    Cardiovascular:   Exercise tolerance: good Hypertension Denies CAD.     Denies Angina.  Functional Capacity Can you climb two flights of stairs? ==> Yes    Pulmonary:   COPD Asthma Denies Recent URI.  Denies Sleep Apnea.    Renal/:  Renal/ Normal     Hepatic/GI:   Denies PUD. Denies Hiatal Hernia. GERD Denies Liver Disease.  Denies Hepatitis. Hx of pancreatitis   Neurological:   Denies CVA. Denies Seizures.    Endocrine:   Diabetes, type 2 Denies Hypothyroidism.        Physical Exam  General: Well nourished, Cooperative and Alert    Airway:  Mallampati: II   Mouth Opening: Normal  TM Distance: Normal  Neck ROM: Normal ROM    Dental:        Anesthesia Plan  Type of Anesthesia, risks & benefits discussed:    Anesthesia Type: Gen Natural Airway  Intra-op Monitoring Plan: Standard ASA Monitors  Post Op Pain Control Plan: multimodal analgesia and IV/PO Opioids PRN  Induction:   IV  Airway Plan: Direct, Post-Induction  Informed Consent: Informed consent signed with the Patient and all parties understand the risks and agree with anesthesia plan.  All questions answered.   ASA Score: 2    Ready For Surgery From Anesthesia Perspective.     .

## 2022-05-06 NOTE — H&P
Short Stay Endoscopy History and Physical    PCP - Roberto Carlos Nava Jr, MD  Referring Physician - Aureliano Lyn MD  [address]  [address]  [place] [id number]    Procedure - eus  ASA - per anesthesia  Mallampati - per anesthesia  History of Anesthesia problems - no  Family history Anesthesia problems -  no   Plan of anesthesia - General    HPI:  This is a 47 y.o. male here for evaluation of: pancreas lesion    Reflux - no  Dysphagia - no  Abdominal pain - no  Diarrhea - no    ROS:  Constitutional: No fevers, chills, No weight loss  CV: No chest pain  Pulm: No cough, No shortness of breath  Ophtho: No vision changes  GI: see HPI  Derm: No rash    Medical History:  has a past medical history of Bronchitis, Diabetic peripheral neuropathy, DM2 (diabetes mellitus, type 2), HTN (hypertension), Hypercholesterolemia, Pancreatic duct stones, Pancreatic lesion, Pancreatitis, and Type 2 diabetes mellitus (8/13/2020).    Surgical History:  has a past surgical history that includes Endoscopic ultrasound of upper gastrointestinal tract (N/A, 2/7/2019); Endoscopic ultrasound of upper gastrointestinal tract (N/A, 4/5/2019); Endoscopic ultrasound of upper gastrointestinal tract (N/A, 6/13/2019); and Endoscopic ultrasound of upper gastrointestinal tract (N/A, 8/29/2019).    Family History: family history is not on file..    Social History:  reports that he has been smoking cigarettes. He started smoking about 35 years ago. He has a 9.00 pack-year smoking history. He does not have any smokeless tobacco history on file. He reports current alcohol use. He reports current drug use. Drug: Marijuana.    Review of patient's allergies indicates:  No Known Allergies    Medications:   Medications Prior to Admission   Medication Sig Dispense Refill Last Dose    albuterol 90 mcg/actuation inhaler Inhale 2 puffs into the lungs every 6 (six) hours as needed for Wheezing.   5/5/2022 at Unknown time    amitriptyline (ELAVIL) 50 MG  tablet Take 50 mg by mouth every evening.   5/5/2022 at Unknown time    cloNIDine (CATAPRES) 0.1 MG tablet   5 5/5/2022 at Unknown time    gabapentin (NEURONTIN) 600 MG tablet Take 600 mg by mouth 3 (three) times daily.   5/6/2022 at Unknown time    HYDROcodone-acetaminophen (NORCO)  mg per tablet   0 Past Week at Unknown time    insulin (BASAGLAR KWIKPEN U-100 INSULIN) glargine 100 units/mL (3mL) SubQ pen Inject 25 Units into the skin every evening. 3 Box 3 5/5/2022 at Unknown time    lisinopril (PRINIVIL,ZESTRIL) 5 MG tablet Take 5 mg by mouth once daily.   5/6/2022 at Unknown time    metFORMIN (GLUCOPHAGE) 1000 MG tablet Take 1,000 mg by mouth daily with breakfast.   5/6/2022 at Unknown time    metoprolol succinate (TOPROL-XL) 50 MG 24 hr tablet   5 5/5/2022 at Unknown time    omeprazole (PRILOSEC) 20 MG capsule Take 1 capsule (20 mg total) by mouth once daily. 30 capsule 1 5/6/2022 at Unknown time    lipase-protease-amylase 12,000-38,000-60,000 units (CREON) CpDR Take by mouth.   More than a month at Unknown time    magnesium oxide (MAG-OX) 400 mg (241.3 mg magnesium) tablet Take 1 tablet (400 mg total) by mouth 2 (two) times daily.  0 More than a month at Unknown time    omeprazole (PRILOSEC) 40 MG capsule   4     ondansetron (ZOFRAN-ODT) 4 MG TbDL Take 8 mg by mouth every 8 (eight) hours.   More than a month at Unknown time       Physical Exam:    Vital Signs:   Vitals:    05/06/22 0918   BP: 122/68   Pulse:    Resp:    Temp:        General Appearance: Well appearing in no acute distress    Labs:  Lab Results   Component Value Date    WBC 4.68 08/15/2020    HGB 10.3 (L) 08/15/2020    HCT 32.1 (L) 08/15/2020     08/15/2020    CHOL 194 12/12/2014    TRIG 72 12/12/2014    HDL 49 12/12/2014    ALT 12 08/15/2020    AST 14 08/15/2020     08/15/2020    K 4.5 08/15/2020     08/15/2020    CREATININE 1.6 (H) 08/15/2020    BUN 33 (H) 08/15/2020    CO2 23 08/15/2020    TSH 1.984  08/13/2020    HGBA1C 12.5 (H) 09/04/2021       I have explained the risks and benefits of this endoscopic procedure to the patient including but not limited to bleeding, inflammation, infection, perforation, and death.      Aureliano Lyn MD

## 2022-05-06 NOTE — TRANSFER OF CARE
"Anesthesia Transfer of Care Note    Patient: Wallace Gonzalez    Procedure(s) Performed: Procedure(s) (LRB):  ULTRASOUND, UPPER GI TRACT, ENDOSCOPIC (N/A)    Patient location: [facility]    Anesthesia Type: general    Transport from OR: Transported from OR on 2-3 L/min O2 by NC with adequate spontaneous ventilation    Post pain: adequate analgesia    Post assessment: no apparent anesthetic complications and tolerated procedure well    Post vital signs: stable    Level of consciousness: sedated    Nausea/Vomiting: no nausea/vomiting    Complications: none    Transfer of care protocol was followed      Last vitals:   Visit Vitals  /68   Pulse 79   Temp 36.9 °C (98.4 °F)   Resp 17   Ht 6' 2" (1.88 m)   Wt 104.8 kg (231 lb)   SpO2 100%   BMI 29.66 kg/m²     "

## 2022-05-09 LAB — POCT GLUCOSE: 214 MG/DL (ref 70–110)

## 2022-09-26 NOTE — TELEPHONE ENCOUNTER
Spoke with patient today.  Dr. Walters does not need to see patient back.  Dr. Lyn had a note in chart about future imaging.  Will send message to his staff.  Patient informed also to call Dr. Lyn for f/u appointment.   The patient has been having palpitations which appear to be somewhat sporadic and increasing in frequency.  I have asked the patient to wear an event monitor so that we may characterize her rhythm status.  She is agreed and she will return to the office in 2 to 3 weeks at which time we can review the results of her monitor and perform an echocardiogram.  For now, I made no changes to her medical regimen.

## 2024-01-26 NOTE — PATIENT INSTRUCTIONS
Discharge Instructions for Acute Kidney Injury  You have been diagnosed with acute kidney injury. This means that your kidneys are not working properly. When both kidneys are healthy, they help filter out fluid and waste from the blood and body. Acute kidney injury has many causes. These include urinary blockages, infection, lack of enough blood supply, and medicines that can injure kidneys. In some cases, acute kidney injury is short-term (temporary), lasting several days to a few months. This is because the kidney can repair itself. But acute kidney injury can also result in chronic kidney disease or end stage renal failure. Here are some instructions for you to follow as you recover.  Home care  · Follow any instructions for eating and drinking given to you by your healthcare provider.  ¨ Drink less fluid, if instructed by your healthcare provider.  ¨ Keep a record of everything you eat and drink.  · Measure the amount of urine and stool you have each day.  · Weigh yourself every day, at the same time of day, and in the same kind of clothes. Keep a daily record of your daily weights.  · Take your temperature every day. Keep a record of the results.  · Learn to take your own blood pressure. Keep a record of your results. Ask your healthcare provider when you should seek emergency medical attention. Your provider will tell you which blood pressure reading is dangerous.  · Avoid contact with people who have infections (colds, bronchitis, or skin conditions).  · Practice good personal hygiene. This is especially important if you have a catheter in place when you leave the hospital. Doing so helps keep you safe from infection.  · Take your medicines exactly as directed.  · You may require frequent blood and urine tests to monitor your kidney function.  Follow-up care  Follow up with your healthcare provider, or as advised.  When to seek medical care  Call your healthcare provider right away if you have any of the  following:  · Signs of bladder infection (urinating more often than usual, or burning, pain, bleeding, or hesitancy when you urinate)  · Signs of infection around your catheter (redness, swelling, warmth, or drainage)  · Rapid weight loss or weight gain, such as 3 pounds or more in 24 hours or 6 pounds or more in 7 days  · Fever above 100.4°F (38.0°C) or chills  · Muscle aches  · Night sweats  · Very little or no urine output  · Swelling of your hands, legs, or feet  · Back pain  · Abdominal pain  · Extreme tiredness   Date Last Reviewed: 2/1/2017  © 5747-8521 [employer]. [address] [place] [address], [place] [address]. All rights reserved. This information is not intended as a substitute for professional medical care. Always follow your healthcare professional's instructions.         6